# Patient Record
Sex: MALE | Race: WHITE | NOT HISPANIC OR LATINO | URBAN - METROPOLITAN AREA
[De-identification: names, ages, dates, MRNs, and addresses within clinical notes are randomized per-mention and may not be internally consistent; named-entity substitution may affect disease eponyms.]

---

## 2017-07-10 ENCOUNTER — INPATIENT (INPATIENT)
Facility: HOSPITAL | Age: 67
LOS: 6 days | Discharge: HOME | DRG: 301 | End: 2017-07-17
Attending: INTERNAL MEDICINE | Admitting: INTERNAL MEDICINE
Payer: MEDICARE

## 2017-07-10 VITALS
HEART RATE: 64 BPM | WEIGHT: 231.04 LBS | SYSTOLIC BLOOD PRESSURE: 130 MMHG | RESPIRATION RATE: 20 BRPM | TEMPERATURE: 97 F | OXYGEN SATURATION: 97 % | DIASTOLIC BLOOD PRESSURE: 83 MMHG

## 2017-07-10 DIAGNOSIS — I82.409 ACUTE EMBOLISM AND THROMBOSIS OF UNSPECIFIED DEEP VEINS OF UNSPECIFIED LOWER EXTREMITY: ICD-10-CM

## 2017-07-10 LAB
ANION GAP SERPL CALC-SCNC: 8 MMOL/L — SIGNIFICANT CHANGE UP (ref 5–17)
APTT BLD: 33.6 SEC — SIGNIFICANT CHANGE UP (ref 27.5–37.4)
BASOPHILS # BLD AUTO: 0 K/UL — SIGNIFICANT CHANGE UP (ref 0–0.2)
BASOPHILS NFR BLD AUTO: 0.8 % — SIGNIFICANT CHANGE UP (ref 0–2)
BUN SERPL-MCNC: 17 MG/DL — SIGNIFICANT CHANGE UP (ref 7–18)
CALCIUM SERPL-MCNC: 8.5 MG/DL — SIGNIFICANT CHANGE UP (ref 8.4–10.5)
CHLORIDE SERPL-SCNC: 104 MMOL/L — SIGNIFICANT CHANGE UP (ref 96–108)
CO2 SERPL-SCNC: 27 MMOL/L — SIGNIFICANT CHANGE UP (ref 22–31)
CREAT SERPL-MCNC: 0.87 MG/DL — SIGNIFICANT CHANGE UP (ref 0.5–1.3)
EOSINOPHIL # BLD AUTO: 0.2 K/UL — SIGNIFICANT CHANGE UP (ref 0–0.5)
EOSINOPHIL NFR BLD AUTO: 4.5 % — SIGNIFICANT CHANGE UP (ref 0–6)
GLUCOSE SERPL-MCNC: 85 MG/DL — SIGNIFICANT CHANGE UP (ref 70–99)
HCT VFR BLD CALC: 38.3 % — LOW (ref 39–50)
HGB BLD-MCNC: 13.2 G/DL — SIGNIFICANT CHANGE UP (ref 13–17)
INR BLD: 1.16 RATIO — SIGNIFICANT CHANGE UP (ref 0.88–1.16)
LYMPHOCYTES # BLD AUTO: 1.5 K/UL — SIGNIFICANT CHANGE UP (ref 1–3.3)
LYMPHOCYTES # BLD AUTO: 29.2 % — SIGNIFICANT CHANGE UP (ref 13–44)
MCHC RBC-ENTMCNC: 33.1 PG — SIGNIFICANT CHANGE UP (ref 27–34)
MCHC RBC-ENTMCNC: 34.4 GM/DL — SIGNIFICANT CHANGE UP (ref 32–36)
MCV RBC AUTO: 96.4 FL — SIGNIFICANT CHANGE UP (ref 80–100)
MONOCYTES # BLD AUTO: 0.3 K/UL — SIGNIFICANT CHANGE UP (ref 0–0.9)
MONOCYTES NFR BLD AUTO: 6.5 % — SIGNIFICANT CHANGE UP (ref 2–14)
NEUTROPHILS # BLD AUTO: 3 K/UL — SIGNIFICANT CHANGE UP (ref 1.8–7.4)
NEUTROPHILS NFR BLD AUTO: 59 % — SIGNIFICANT CHANGE UP (ref 43–77)
PLATELET # BLD AUTO: 188 K/UL — SIGNIFICANT CHANGE UP (ref 150–400)
POTASSIUM SERPL-MCNC: 4.5 MMOL/L — SIGNIFICANT CHANGE UP (ref 3.5–5.3)
POTASSIUM SERPL-SCNC: 4.5 MMOL/L — SIGNIFICANT CHANGE UP (ref 3.5–5.3)
PROTHROM AB SERPL-ACNC: 12.7 SEC — SIGNIFICANT CHANGE UP (ref 9.8–12.7)
RBC # BLD: 3.97 M/UL — LOW (ref 4.2–5.8)
RBC # FLD: 12.1 % — SIGNIFICANT CHANGE UP (ref 10.3–14.5)
SODIUM SERPL-SCNC: 139 MMOL/L — SIGNIFICANT CHANGE UP (ref 135–145)
WBC # BLD: 5.1 K/UL — SIGNIFICANT CHANGE UP (ref 3.8–10.5)
WBC # FLD AUTO: 5.1 K/UL — SIGNIFICANT CHANGE UP (ref 3.8–10.5)

## 2017-07-10 PROCEDURE — 74177 CT ABD & PELVIS W/CONTRAST: CPT | Mod: 26

## 2017-07-10 PROCEDURE — 99285 EMERGENCY DEPT VISIT HI MDM: CPT

## 2017-07-10 PROCEDURE — 93971 EXTREMITY STUDY: CPT | Mod: 26,LT

## 2017-07-10 RX ORDER — ENOXAPARIN SODIUM 100 MG/ML
100 INJECTION SUBCUTANEOUS ONCE
Qty: 0 | Refills: 0 | Status: COMPLETED | OUTPATIENT
Start: 2017-07-10 | End: 2017-07-10

## 2017-07-10 RX ORDER — ENOXAPARIN SODIUM 100 MG/ML
100 INJECTION SUBCUTANEOUS EVERY 12 HOURS
Qty: 0 | Refills: 0 | Status: DISCONTINUED | OUTPATIENT
Start: 2017-07-10 | End: 2017-07-17

## 2017-07-10 RX ORDER — ACETAMINOPHEN 500 MG
650 TABLET ORAL EVERY 6 HOURS
Qty: 0 | Refills: 0 | Status: DISCONTINUED | OUTPATIENT
Start: 2017-07-10 | End: 2017-07-17

## 2017-07-10 RX ADMIN — ENOXAPARIN SODIUM 100 MILLIGRAM(S): 100 INJECTION SUBCUTANEOUS at 17:22

## 2017-07-10 NOTE — ED PROVIDER NOTE - PSH
Benign Tumor of Short Bones of Lower Limb  removed  Hand(s) Dupuytrens Contracture  repair x 3 bilaterally  History of Tonsillectomy    S/P Colonoscopic Polypectomy  ? year.

## 2017-07-10 NOTE — ED PROVIDER NOTE - MEDICAL DECISION MAKING DETAILS
66 y/o male presents to the ED c/o L calf pain x 3 days. Will get sono to r/u DVT. Will give analgesia and reassess.

## 2017-07-10 NOTE — ED PROVIDER NOTE - OBJECTIVE STATEMENT
66 y/o male with PMHx of HLD presents to the ED c/o L calf pain x 3 days. Pt notes he was sent to the ED by his PMD for his Sx. Pt denies chest pain, shortness of breath, trauma or any other complaints. NKDA.

## 2017-07-11 DIAGNOSIS — I82.409 ACUTE EMBOLISM AND THROMBOSIS OF UNSPECIFIED DEEP VEINS OF UNSPECIFIED LOWER EXTREMITY: ICD-10-CM

## 2017-07-11 DIAGNOSIS — G40.909 EPILEPSY, UNSPECIFIED, NOT INTRACTABLE, WITHOUT STATUS EPILEPTICUS: ICD-10-CM

## 2017-07-11 DIAGNOSIS — M72.0 PALMAR FASCIAL FIBROMATOSIS [DUPUYTREN]: ICD-10-CM

## 2017-07-11 DIAGNOSIS — I82.432 ACUTE EMBOLISM AND THROMBOSIS OF LEFT POPLITEAL VEIN: ICD-10-CM

## 2017-07-11 DIAGNOSIS — E78.5 HYPERLIPIDEMIA, UNSPECIFIED: ICD-10-CM

## 2017-07-11 DIAGNOSIS — Z29.9 ENCOUNTER FOR PROPHYLACTIC MEASURES, UNSPECIFIED: ICD-10-CM

## 2017-07-11 LAB
24R-OH-CALCIDIOL SERPL-MCNC: 17 NG/ML — LOW (ref 30–100)
ALBUMIN SERPL ELPH-MCNC: 3.3 G/DL — LOW (ref 3.5–5)
ALP SERPL-CCNC: 101 U/L — SIGNIFICANT CHANGE UP (ref 40–120)
ALT FLD-CCNC: 18 U/L DA — SIGNIFICANT CHANGE UP (ref 10–60)
ANION GAP SERPL CALC-SCNC: 7 MMOL/L — SIGNIFICANT CHANGE UP (ref 5–17)
AST SERPL-CCNC: 15 U/L — SIGNIFICANT CHANGE UP (ref 10–40)
AT III ACT/NOR PPP CHRO: 97 % — SIGNIFICANT CHANGE UP (ref 85–135)
BASOPHILS # BLD AUTO: 0.1 K/UL — SIGNIFICANT CHANGE UP (ref 0–0.2)
BASOPHILS NFR BLD AUTO: 1.4 % — SIGNIFICANT CHANGE UP (ref 0–2)
BILIRUB SERPL-MCNC: 0.4 MG/DL — SIGNIFICANT CHANGE UP (ref 0.2–1.2)
BUN SERPL-MCNC: 13 MG/DL — SIGNIFICANT CHANGE UP (ref 7–18)
CALCIUM SERPL-MCNC: 8.2 MG/DL — LOW (ref 8.4–10.5)
CHLORIDE SERPL-SCNC: 105 MMOL/L — SIGNIFICANT CHANGE UP (ref 96–108)
CHOLEST SERPL-MCNC: 186 MG/DL — SIGNIFICANT CHANGE UP (ref 10–199)
CO2 SERPL-SCNC: 26 MMOL/L — SIGNIFICANT CHANGE UP (ref 22–31)
CREAT SERPL-MCNC: 0.76 MG/DL — SIGNIFICANT CHANGE UP (ref 0.5–1.3)
EOSINOPHIL # BLD AUTO: 0.2 K/UL — SIGNIFICANT CHANGE UP (ref 0–0.5)
EOSINOPHIL NFR BLD AUTO: 4.5 % — SIGNIFICANT CHANGE UP (ref 0–6)
FOLATE SERPL-MCNC: 18.7 NG/ML — SIGNIFICANT CHANGE UP (ref 4.8–24.2)
GLUCOSE SERPL-MCNC: 77 MG/DL — SIGNIFICANT CHANGE UP (ref 70–99)
HBA1C BLD-MCNC: 5.3 % — SIGNIFICANT CHANGE UP (ref 4–5.6)
HCT VFR BLD CALC: 37.7 % — LOW (ref 39–50)
HCYS SERPL-MCNC: 9.2 UMOL/L — SIGNIFICANT CHANGE UP (ref 5–20)
HDLC SERPL-MCNC: 60 MG/DL — SIGNIFICANT CHANGE UP (ref 40–125)
HGB BLD-MCNC: 13.4 G/DL — SIGNIFICANT CHANGE UP (ref 13–17)
LIPID PNL WITH DIRECT LDL SERPL: 104 MG/DL — SIGNIFICANT CHANGE UP
LYMPHOCYTES # BLD AUTO: 1 K/UL — SIGNIFICANT CHANGE UP (ref 1–3.3)
LYMPHOCYTES # BLD AUTO: 23.1 % — SIGNIFICANT CHANGE UP (ref 13–44)
MAGNESIUM SERPL-MCNC: 2.5 MG/DL — SIGNIFICANT CHANGE UP (ref 1.6–2.6)
MCHC RBC-ENTMCNC: 34 PG — SIGNIFICANT CHANGE UP (ref 27–34)
MCHC RBC-ENTMCNC: 35.4 GM/DL — SIGNIFICANT CHANGE UP (ref 32–36)
MCV RBC AUTO: 95.9 FL — SIGNIFICANT CHANGE UP (ref 80–100)
MONOCYTES # BLD AUTO: 0.4 K/UL — SIGNIFICANT CHANGE UP (ref 0–0.9)
MONOCYTES NFR BLD AUTO: 8.6 % — SIGNIFICANT CHANGE UP (ref 2–14)
NEUTROPHILS # BLD AUTO: 2.8 K/UL — SIGNIFICANT CHANGE UP (ref 1.8–7.4)
NEUTROPHILS NFR BLD AUTO: 62.4 % — SIGNIFICANT CHANGE UP (ref 43–77)
PHENOBARB SERPL-MCNC: 21.8 UG/ML — SIGNIFICANT CHANGE UP (ref 15–40)
PHOSPHATE SERPL-MCNC: 3 MG/DL — SIGNIFICANT CHANGE UP (ref 2.5–4.5)
PLATELET # BLD AUTO: 186 K/UL — SIGNIFICANT CHANGE UP (ref 150–400)
POTASSIUM SERPL-MCNC: 4 MMOL/L — SIGNIFICANT CHANGE UP (ref 3.5–5.3)
POTASSIUM SERPL-SCNC: 4 MMOL/L — SIGNIFICANT CHANGE UP (ref 3.5–5.3)
PROT SERPL-MCNC: 7.1 G/DL — SIGNIFICANT CHANGE UP (ref 6–8.3)
RBC # BLD: 3.93 M/UL — LOW (ref 4.2–5.8)
RBC # FLD: 12.4 % — SIGNIFICANT CHANGE UP (ref 10.3–14.5)
SODIUM SERPL-SCNC: 138 MMOL/L — SIGNIFICANT CHANGE UP (ref 135–145)
TOTAL CHOLESTEROL/HDL RATIO MEASUREMENT: 3.1 RATIO — LOW (ref 3.4–9.6)
TRIGL SERPL-MCNC: 109 MG/DL — SIGNIFICANT CHANGE UP (ref 10–149)
TSH SERPL-MCNC: 2.52 UU/ML — SIGNIFICANT CHANGE UP (ref 0.34–4.82)
VIT B12 SERPL-MCNC: 363 PG/ML — SIGNIFICANT CHANGE UP (ref 243–894)
WBC # BLD: 4.5 K/UL — SIGNIFICANT CHANGE UP (ref 3.8–10.5)
WBC # FLD AUTO: 4.5 K/UL — SIGNIFICANT CHANGE UP (ref 3.8–10.5)

## 2017-07-11 PROCEDURE — G0452: CPT | Mod: 26

## 2017-07-11 RX ORDER — PHENOBARBITAL 60 MG
32.4 TABLET ORAL ONCE
Qty: 0 | Refills: 0 | Status: DISCONTINUED | OUTPATIENT
Start: 2017-07-11 | End: 2017-07-11

## 2017-07-11 RX ORDER — PHENOBARBITAL 60 MG
30 TABLET ORAL
Qty: 0 | Refills: 0 | Status: DISCONTINUED | OUTPATIENT
Start: 2017-07-11 | End: 2017-07-11

## 2017-07-11 RX ORDER — TAMSULOSIN HYDROCHLORIDE 0.4 MG/1
0.4 CAPSULE ORAL AT BEDTIME
Qty: 0 | Refills: 0 | Status: DISCONTINUED | OUTPATIENT
Start: 2017-07-11 | End: 2017-07-17

## 2017-07-11 RX ORDER — METOPROLOL TARTRATE 50 MG
100 TABLET ORAL
Qty: 0 | Refills: 0 | Status: DISCONTINUED | OUTPATIENT
Start: 2017-07-11 | End: 2017-07-17

## 2017-07-11 RX ORDER — LEVOTHYROXINE SODIUM 125 MCG
25 TABLET ORAL DAILY
Qty: 0 | Refills: 0 | Status: DISCONTINUED | OUTPATIENT
Start: 2017-07-11 | End: 2017-07-17

## 2017-07-11 RX ORDER — AMIODARONE HYDROCHLORIDE 400 MG/1
200 TABLET ORAL DAILY
Qty: 0 | Refills: 0 | Status: DISCONTINUED | OUTPATIENT
Start: 2017-07-11 | End: 2017-07-17

## 2017-07-11 RX ORDER — WARFARIN SODIUM 2.5 MG/1
5 TABLET ORAL ONCE
Qty: 0 | Refills: 0 | Status: COMPLETED | OUTPATIENT
Start: 2017-07-11 | End: 2017-07-11

## 2017-07-11 RX ORDER — ATORVASTATIN CALCIUM 80 MG/1
10 TABLET, FILM COATED ORAL AT BEDTIME
Qty: 0 | Refills: 0 | Status: DISCONTINUED | OUTPATIENT
Start: 2017-07-11 | End: 2017-07-17

## 2017-07-11 RX ORDER — PHENOBARBITAL 60 MG
32.4 TABLET ORAL
Qty: 0 | Refills: 0 | Status: DISCONTINUED | OUTPATIENT
Start: 2017-07-11 | End: 2017-07-17

## 2017-07-11 RX ADMIN — Medication 650 MILLIGRAM(S): at 05:58

## 2017-07-11 RX ADMIN — Medication 100 MILLIGRAM(S): at 17:47

## 2017-07-11 RX ADMIN — WARFARIN SODIUM 5 MILLIGRAM(S): 2.5 TABLET ORAL at 22:24

## 2017-07-11 RX ADMIN — Medication 30 MILLIGRAM(S): at 05:58

## 2017-07-11 RX ADMIN — ATORVASTATIN CALCIUM 10 MILLIGRAM(S): 80 TABLET, FILM COATED ORAL at 22:24

## 2017-07-11 RX ADMIN — Medication 100 MILLIGRAM(S): at 01:06

## 2017-07-11 RX ADMIN — Medication 650 MILLIGRAM(S): at 22:23

## 2017-07-11 RX ADMIN — Medication 100 MILLIGRAM(S): at 05:58

## 2017-07-11 RX ADMIN — Medication 650 MILLIGRAM(S): at 06:59

## 2017-07-11 RX ADMIN — Medication 32.4 MILLIGRAM(S): at 17:47

## 2017-07-11 RX ADMIN — ENOXAPARIN SODIUM 100 MILLIGRAM(S): 100 INJECTION SUBCUTANEOUS at 17:47

## 2017-07-11 RX ADMIN — Medication 650 MILLIGRAM(S): at 23:20

## 2017-07-11 RX ADMIN — AMIODARONE HYDROCHLORIDE 200 MILLIGRAM(S): 400 TABLET ORAL at 05:57

## 2017-07-11 RX ADMIN — ENOXAPARIN SODIUM 100 MILLIGRAM(S): 100 INJECTION SUBCUTANEOUS at 05:57

## 2017-07-11 RX ADMIN — Medication 32.4 MILLIGRAM(S): at 01:06

## 2017-07-11 NOTE — H&P ADULT - PROBLEM SELECTOR PLAN 3
c/w lipitor  f/u pharmacy in AM for correct dosage, patient does not remember  ([phone]) [facility]  f/u lipid profile

## 2017-07-11 NOTE — PATIENT PROFILE ADULT. - VISION (WITH CORRECTIVE LENSES IF THE PATIENT USUALLY WEARS THEM):
Normal vision: sees adequately in most situations; can see medication labels, newsprint/but pt wears reading glasses

## 2017-07-11 NOTE — CONSULT NOTE ADULT - SUBJECTIVE AND OBJECTIVE BOX
6:30pm 6S    Orig  HPI:  Patient is a 67M with PMH seizure  HLD, hx mitral valve replacement 2011 (reports being on amiodarone after surgery) had severe MR with ruptured tendinae,  presenting  due to 1 day Hx L leg pain, referred by PMD to come to ED.  He denies fever, chills, SOB, palpitations, chest pain, recent travel or immobilization, current cancer, nausea, vomiting, diarrhea or constipation. Pt has no coronary hx and at time of MVR no revasc intervention recommended  Pt repeatedly questioned as to why taking Amio, does not know, not ever told of AFib, other cardiac arrhythmias, never took AC therapy before.    PMH   Seizure Dis since childhood  Prostate CA, s/p seed implants [id number]    Surgery Hx: Benign Tumor of Short Bones of Lower Limb  removed, Hand(s) Dupuytrens Contracture  repair x 3 bilaterally, History of Tonsillectomy, S/P Colonoscopic Polypectomy  Fam Hx: family history of DVT  Social Hx: negative for smoking, EtOH or illicit drugs    Allergies: NKDA (11 Jul 2017 00:46)    PRE-ADM MEDS: as per res adm note    SURGERIES: dupuytren's contracture bilaterally with three prior repairs    ROS: non-contributory except as outlined above.      EXAM:  In NAD    Vital Signs Last 24 Hrs  T(C): 36.6 (11 Jul 2017 14:28), Max: 36.6 (11 Jul 2017 14:28)  T(F): 97.8 (11 Jul 2017 14:28), Max: 97.8 (11 Jul 2017 14:28)  HR: 69 (11 Jul 2017 17:18) (57 - 69)  BP: 127/76 (11 Jul 2017 17:18) (122/58 - 129/65)  BP(mean): --  RR: 16 (11 Jul 2017 14:28) (16 - 17)  SpO2: 96% (11 Jul 2017 14:28) (96% - 97%)  Daily          HEENT: no icterus  NECK: no JVD/HJR, nl c. pulses w/o bruits LUNS: clear  COR: inc s1/ nl s2, no m, g, rub  ABD: nl BS, soft, not tender, no organomeg/masses  LE: no edema, periperal pulses not palpable    EKG: NSR, 1st deg AVB, otherwise unremarkable    US Duplex Venous Lower Ext Ltd, Left (07.10.17 @ 15:41) >  Left popliteal and peroneal vein nonocclusive DVT.    CT Abdomen and Pelvis w/ IV Cont (07.10.17 @ 18:26)   < from: CT Abdomen and Pelvis w/ IV Cont (07.10.17 @ 18:26) >  Atelectasis lung bases. Cardiomegaly noted  No gross DVT in the IVC, common iliac and external iliac veins.        LABS:                         13.4   4.5   )-----------( 186      ( 11 Jul 2017 07:09 )             37.7     07-11    138  |  105  |  13  ----------------------------<  77  4.0   |  26  |  0.76    Ca    8.2<L>      11 Jul 2017 07:09  Phos  3.0     07-11  Mg     2.5     07-11    TPro  7.1  /  Alb  3.3<L>  /  TBili  0.4  /  DBili  x   /  AST  15  /  ALT  18  /  AlkPhos  101  07-11      MEDICATIONS  (STANDING):    enoxaparin Injectable 100 milliGRAM(s) SubCutaneous every 12 hours  phenytoin   Capsule 100 milliGRAM(s) Oral <User Schedule>  phenytoin   Capsule 100 milliGRAM(s) Oral <User Schedule>  amiodarone    Tablet 200 milliGRAM(s) Oral daily  atorvastatin 10 milliGRAM(s) Oral at bedtime  tamsulosin 0.4 milliGRAM(s) Oral at bedtime  levothyroxine 25 MICROGram(s) Oral daily  metoprolol 100 milliGRAM(s) Oral two times a day  PHENobarbital 32.4 milliGRAM(s) Oral two times a day

## 2017-07-11 NOTE — H&P ADULT - HISTORY OF PRESENT ILLNESS
Patient is a 67M, from home, lives with mother and brother, unsteady gait at baseline, with PMH seizure d/o, dupuytren's contracture bilaterally with three prior repairs, HLD, hx "leaky valve" s/p mitral valve replacement 2011 2/2 severe MR with ruptured tendinae, pericardial effusion drainage in 2011, presenting due to 1 day Hx L leg pain, referred by PMD to come to ED. Patient endorses family history of DVT- mother has 3 DVTs. He denies fever, chills, SOB, palpitations, chest pain, recent travel or immobilization, current cancer, nausea, vomiting, diarrhea or constipation.     PMH: as per HPI  Surgery Hx: Benign Tumor of Short Bones of Lower Limb  removed, Hand(s) Dupuytrens Contracture  repair x 3 bilaterally, History of Tonsillectomy, S/P Colonoscopic Polypectomy  Fam Hx: family history of DVT  Social Hx: negative for smoking, EtOH or illicit drugs  Allergies: NKDA Patient is a 67M, from home, lives with mother and brother, unsteady gait at baseline, with PMH seizure d/o, dupuytren's contracture bilaterally with three prior repairs, HLD, hx "leaky valve" s/p mitral valve replacement 2011 (reports being on amiodarone after surgery) 2/2 severe MR with ruptured tendinae, pericardial effusion drainage in 2011, presenting due to 1 day Hx L leg pain, referred by PMD to come to ED. Patient endorses family history of DVT- mother has 3 DVTs. He denies fever, chills, SOB, palpitations, chest pain, recent travel or immobilization, current cancer, nausea, vomiting, diarrhea or constipation.     PMH: as per HPI  Surgery Hx: Benign Tumor of Short Bones of Lower Limb  removed, Hand(s) Dupuytrens Contracture  repair x 3 bilaterally, History of Tonsillectomy, S/P Colonoscopic Polypectomy  Fam Hx: family history of DVT  Social Hx: negative for smoking, EtOH or illicit drugs  Allergies: NKDA

## 2017-07-11 NOTE — CONSULT NOTE ADULT - SUBJECTIVE AND OBJECTIVE BOX
Patient is a 67y old  Male who presents with a chief complaint of L calf pain (11 Jul 2017 00:46)    Seems to be unprovoked. But realized that he has left leg swelling and pain for at least five days before he presented himself to the hospital. Mother has a strong history of three events. Mother is on coumadin.    PMH seizure d/o, dupuytren's contracture bilaterally with three prior repairs, HLD, hx "leaky valve" s/p mitral valve replacement 2011 (reports being on amiodarone after surgery) severe MR with ruptured tendinae, pericardial effusion drainage in 2011,    PMH: as per HPI ALSO has history of prostate cancer and was treated with seed implants.  Surgery Hx: Benign Tumor of Short Bones of Lower Limb  removed, Hand(s) Dupuytrens Contracture  repair x 3 bilaterally, History of Tonsillectomy, S/P Colonoscopic Polypectomy  Fam Hx: family history of DVT  Social Hx: negative for smoking, EtOH or illicit drugs. Retired.   Allergies: NKDA (11 Jul 2017 00:46)       ROS:  no headaches, no chest pain, no shortness of breath, no bleeding no bruising.     PAST MEDICAL & SURGICAL HISTORY:  Dupuytren's Contracture of Hand  Seizure Disorder  Hyperlipemia  S/P Colonoscopic Polypectomy:   Hand(s) Dupuytrens Contracture: repair x 3 bilaterally  Benign Tumor of Short Bones of Lower Limb: removed  History of Tonsillectomy      SOCIAL HISTORY:lives with mother and brother    FAMILY HISTORY:  DVT in mother times three      MEDICATIONS  (STANDING):  enoxaparin Injectable 100 milliGRAM(s) SubCutaneous every 12 hours  phenytoin   Capsule 100 milliGRAM(s) Oral <User Schedule>  phenytoin   Capsule 100 milliGRAM(s) Oral <User Schedule>  amiodarone    Tablet 200 milliGRAM(s) Oral daily  atorvastatin 10 milliGRAM(s) Oral at bedtime  tamsulosin 0.4 milliGRAM(s) Oral at bedtime  levothyroxine 25 MICROGram(s) Oral daily  metoprolol 100 milliGRAM(s) Oral two times a day  PHENobarbital 32.4 milliGRAM(s) Oral two times a day    MEDICATIONS  (PRN):  acetaminophen   Tablet. 650 milliGRAM(s) Oral every 6 hours PRN Mild Pain (1 - 3)      Allergies    No Known Allergies    Intolerances        Vital Signs Last 24 Hrs  T(C): 36.6 (11 Jul 2017 14:28), Max: 36.6 (11 Jul 2017 14:28)  T(F): 97.8 (11 Jul 2017 14:28), Max: 97.8 (11 Jul 2017 14:28)  HR: 69 (11 Jul 2017 17:18) (57 - 69)  BP: 127/76 (11 Jul 2017 17:18) (122/58 - 129/65)  BP(mean): --  RR: 16 (11 Jul 2017 14:28) (16 - 17)  SpO2: 96% (11 Jul 2017 14:28) (96% - 97%)    PHYSICAL EXAM  General: adult in NAD  HEENT: clear oropharynx, anicteric sclera, pink conjunctiva  Neck: supple  CV: normal S1/S2 with no murmur rubs or gallops  Lungs: positive air movement b/l ant lungs,clear to auscultation, no wheezes, no rales  Abdomen: soft non-tender non-distended, no hepatosplenomegaly  Ext: no clubbing cyanosis or edema  Skin: no rashes and no petechiae. multiple skin tags  Neuro: alert and oriented X 4, no focal deficits      LABS:                          13.4   4.5   )-----------( 186      ( 11 Jul 2017 07:09 )             37.7         Mean Cell Volume : 95.9 fl  Mean Cell Hemoglobin : 34.0 pg  Mean Cell Hemoglobin Concentration : 35.4 gm/dL  Auto Neutrophil # : 2.8 K/uL  Auto Lymphocyte # : 1.0 K/uL  Auto Monocyte # : 0.4 K/uL  Auto Eosinophil # : 0.2 K/uL  Auto Basophil # : 0.1 K/uL  Auto Neutrophil % : 62.4 %  Auto Lymphocyte % : 23.1 %  Auto Monocyte % : 8.6 %  Auto Eosinophil % : 4.5 %  Auto Basophil % : 1.4 %      Serial CBC's  07-11 @ 07:09  Hct-37.7 / Hgb-13.4 / Plat-186 / RBC-3.93 / WBC-4.5  Serial CBC's  07-10 @ 17:04  Hct-38.3 / Hgb-13.2 / Plat-188 / RBC-3.97 / WBC-5.1      07-11    138  |  105  |  13  ----------------------------<  77  4.0   |  26  |  0.76    Ca    8.2<L>      11 Jul 2017 07:09  Phos  3.0     07-11  Mg     2.5     07-11    TPro  7.1  /  Alb  3.3<L>  /  TBili  0.4  /  DBili  x   /  AST  15  /  ALT  18  /  AlkPhos  101  07-11      PT/INR - ( 10 Jul 2017 17:04 )   PT: 12.7 sec;   INR: 1.16 ratio         PTT - ( 10 Jul 2017 17:04 )  PTT:33.6 sec    Folate, Serum: 18.7 ng/mL (07-11 @ 09:23)  Vitamin B12, Serum: 363 pg/mL (07-11 @ 09:23)

## 2017-07-11 NOTE — H&P ADULT - NSHPLABSRESULTS_GEN_ALL_CORE
LABS:                        13.2   5.1   )-----------( 188      ( 10 Jul 2017 17:04 )             38.3     07-10    139  |  104  |  17  ----------------------------<  85  4.5   |  27  |  0.87    Ca    8.5      10 Jul 2017 17:04    PT/INR - ( 10 Jul 2017 17:04 )   PT: 12.7 sec;   INR: 1.16 ratio      PTT - ( 10 Jul 2017 17:04 )  PTT:33.6 sec    EKst deg av block    < from: US Duplex Venous Lower Ext Ltd, Left (07.10.17 @ 15:41) >    IMPRESSION:  Left popliteal and peroneal vein nonocclusive DVT.    Findings were discussed with Dr. Murry on 7/10/2017 3:47 PM with readback.    < end of copied text >    < from: CT Abdomen and Pelvis w/ IV Cont (07.10.17 @ 18:26) >    IMPRESSION:  No gross DVT in the IVC, common iliac and external iliac veins.     < end of copied text >

## 2017-07-11 NOTE — H&P ADULT - NSHPPHYSICALEXAM_GEN_ALL_CORE
INTERVAL HPI/OVERNIGHT EVENTS:  T(C): 36.4 (07-10-17 @ 21:58), Max: 36.4 (07-10-17 @ 21:58)  HR: 57 (07-10-17 @ 21:58) (57 - 64)  BP: 127/68 (07-10-17 @ 21:58) (127/68 - 130/83)  RR: 17 (07-10-17 @ 21:58) (17 - 20)  SpO2: 97% (07-10-17 @ 21:58) (97% - 97%)    PHYSICAL EXAM:  GENERAL: NAD, poor hygiene  HEAD:  Atraumatic, Normocephalic  EYES: EOMI, PERRLA, conjunctiva and sclera clear  ENMT: No tonsillar erythema, exudates, or enlargement; Moist mucous membranes  NECK: Supple, No JVD, Normal thyroid  NERVOUS SYSTEM:  Alert & Oriented X3, Good concentration; Motor Strength 5/5 B/L upper and lower extremities; DTRs 2+ intact and symmetric; unsteady gait  CHEST/LUNG: Clear to percussion bilaterally; No rales, rhonchi, wheezing, or rubs  HEART: Regular rate and rhythm; No murmurs, rubs, or gallops  ABDOMEN: Soft, Nontender, Nondistended; Bowel sounds present  EXTREMITIES:  2+ Peripheral Pulses, No clubbing, cyanosis, or edema  SKIN: pityriasis rosea on back

## 2017-07-11 NOTE — CONSULT NOTE ADULT - ASSESSMENT
A/P    1. s/p unprovoked DVT LLE >> at least 6mos of AC, maybe indefinite. Discussed AC with heme/onc agree Warfarin better choice given the interaction of all DOACs with pt's Dilantin  2. Clinically stable CV status  3. s/p MV repair with intact function based on exam  4. Unclear why on Amio  unless at one time started for afib suppression but pt now 6 yrs post OHS/MVRepair. Will review issue with his pvt cardiologist.  5. Can get 2Decho if not done on the outside within the last year.
Recommend at least three months of anticoagulation and then evaluate at that time. It is not clear if there was a provocation. CT abdomen is negative for any occult disease. 'Hypercoag" workup is underway, but sometimes be misleading if done in acute setting

## 2017-07-11 NOTE — H&P ADULT - PROBLEM SELECTOR PLAN 1
L LE doppler: Left popliteal and peroneal vein nonocclusive DVT  unprovoked DVT- family Hx  c/w full dose lovenox for tx  f/u hypercoag workup for DVT L LE doppler: Left popliteal and peroneal vein nonocclusive DVT  unprovoked DVT- family Hx  c/w full dose lovenox for tx  f/u hypercoag workup for DVT  Dr. Gondal- heme/onc consult  Dr. Ibarra- cardio consult 2/2 extensive cardiac history

## 2017-07-11 NOTE — H&P ADULT - ASSESSMENT
Patient is a 67M, from home, lives with mother and brother, unsteady gait at baseline, with PMH seizure d/o, dupuytren's contracture bilaterally with three prior repairs, HLD, hx "leaky valve" s/p mitral valve replacement 2011 2/2 severe MR with ruptured tendinae, pericardial effusion drainage in 2011, presenting due to 1 day Hx L leg pain, referred by PMD to come to ED. Admitted for Left popliteal and peroneal vein nonocclusive DVT. Fam Hx + DVT. F/u hypercoag work up and genetic markers. Full dose lovenox. Patient is a 67M, from home, lives with mother and brother, unsteady gait at baseline, with PMH seizure d/o, dupuytren's contracture bilaterally with three prior repairs, HLD, hx "leaky valve" s/p mitral valve replacement 2011 2/2 severe MR with ruptured tendinae, pericardial effusion drainage in 2011, presenting due to 1 day Hx L leg pain, referred by PMD to come to ED. Admitted for Left popliteal and peroneal vein nonocclusive DVT. Fam Hx + DVT. F/u hypercoag work up and genetic work-up. Full dose lovenox.

## 2017-07-11 NOTE — CONSULT NOTE ADULT - PROBLEM SELECTOR RECOMMENDATION 9
seems to be unprovoked, and has a strong family history. He is on dilantin and phenobarbital and has a high risk of interaction with NOAC and them. Given that, will recommend to bridge to coumadin. Although there is always a potential interaction with Amiodarone. frequent check will be needed.  Recommend to start at 5 mg po daily and keep lovenox on for at least five days and for at least two days of therapeutic INR ( between 2-3) before discontinuing lovenox

## 2017-07-11 NOTE — CHART NOTE - NSCHARTNOTEFT_GEN_A_CORE
PGY2 informed by Dr. Gondal to start lovenox bridging to coumadin at 5mg, monitor INR and adjust coumadin for goal INR 2-3.

## 2017-07-12 LAB
ANA PAT FLD IF-IMP: ABNORMAL
ANA TITR SER: ABNORMAL
CARDIOLIPIN AB SER-ACNC: NEGATIVE — SIGNIFICANT CHANGE UP
DSDNA AB SER-ACNC: <12 IU/ML — SIGNIFICANT CHANGE UP
INR BLD: 1.22 RATIO — HIGH (ref 0.88–1.16)
MTHFR GENE INTERPRETATION: SIGNIFICANT CHANGE UP
PROTHROM AB SERPL-ACNC: 13.3 SEC — HIGH (ref 9.8–12.7)

## 2017-07-12 RX ORDER — WARFARIN SODIUM 2.5 MG/1
5 TABLET ORAL ONCE
Qty: 0 | Refills: 0 | Status: COMPLETED | OUTPATIENT
Start: 2017-07-12 | End: 2017-07-12

## 2017-07-12 RX ADMIN — Medication 100 MILLIGRAM(S): at 06:08

## 2017-07-12 RX ADMIN — Medication 650 MILLIGRAM(S): at 12:48

## 2017-07-12 RX ADMIN — Medication 100 MILLIGRAM(S): at 14:29

## 2017-07-12 RX ADMIN — ATORVASTATIN CALCIUM 10 MILLIGRAM(S): 80 TABLET, FILM COATED ORAL at 22:49

## 2017-07-12 RX ADMIN — ENOXAPARIN SODIUM 100 MILLIGRAM(S): 100 INJECTION SUBCUTANEOUS at 18:39

## 2017-07-12 RX ADMIN — Medication 100 MILLIGRAM(S): at 18:39

## 2017-07-12 RX ADMIN — AMIODARONE HYDROCHLORIDE 200 MILLIGRAM(S): 400 TABLET ORAL at 06:08

## 2017-07-12 RX ADMIN — Medication 32.4 MILLIGRAM(S): at 06:08

## 2017-07-12 RX ADMIN — Medication 25 MICROGRAM(S): at 06:08

## 2017-07-12 RX ADMIN — Medication 100 MILLIGRAM(S): at 22:49

## 2017-07-12 RX ADMIN — WARFARIN SODIUM 5 MILLIGRAM(S): 2.5 TABLET ORAL at 22:48

## 2017-07-12 RX ADMIN — Medication 650 MILLIGRAM(S): at 13:40

## 2017-07-12 RX ADMIN — ENOXAPARIN SODIUM 100 MILLIGRAM(S): 100 INJECTION SUBCUTANEOUS at 06:08

## 2017-07-12 RX ADMIN — TAMSULOSIN HYDROCHLORIDE 0.4 MILLIGRAM(S): 0.4 CAPSULE ORAL at 22:49

## 2017-07-12 RX ADMIN — Medication 32.4 MILLIGRAM(S): at 18:39

## 2017-07-12 NOTE — PROGRESS NOTE ADULT - PROBLEM SELECTOR PLAN 1
L LE doppler: Left popliteal and peroneal vein nonocclusive DVT  unprovoked DVT- family Hx  c/w full dose lovenox for tx  f/u hypercoag workup for DVT  Pt on dilantin and phenobarbital and has a high risk of interaction with novel AC  As per dr estrada to keep lovenox on for at least five days and for at least two days of therapeutic INR ( between 2-3) before discontinuing lovenox.

## 2017-07-12 NOTE — PROGRESS NOTE ADULT - ASSESSMENT
Patient is a 67M, from home, lives with mother and brother, unsteady gait at baseline, with PMH seizure d/o, dupuytren's contracture bilaterally with three prior repairs, HLD, hx "leaky valve" s/p mitral valve replacement 2011 2/2 severe MR with ruptured tendinae, pericardial effusion drainage in 2011, presenting due to 1 day Hx L leg pain, referred by PMD to come to ED. Admitted for Left popliteal and peroneal vein nonocclusive DVT.

## 2017-07-12 NOTE — PROGRESS NOTE ADULT - SUBJECTIVE AND OBJECTIVE BOX
Patient is a 67y old  Male who presents with a chief complaint of L calf pain (11 Jul 2017 00:46)      INTERVAL HPI/OVERNIGHT EVENTS:    MEDICATIONS  (STANDING):  enoxaparin Injectable 100 milliGRAM(s) SubCutaneous every 12 hours  phenytoin   Capsule 100 milliGRAM(s) Oral <User Schedule>  phenytoin   Capsule 100 milliGRAM(s) Oral <User Schedule>  amiodarone    Tablet 200 milliGRAM(s) Oral daily  atorvastatin 10 milliGRAM(s) Oral at bedtime  tamsulosin 0.4 milliGRAM(s) Oral at bedtime  levothyroxine 25 MICROGram(s) Oral daily  metoprolol 100 milliGRAM(s) Oral two times a day  PHENobarbital 32.4 milliGRAM(s) Oral two times a day  warfarin 5 milliGRAM(s) Oral once    MEDICATIONS  (PRN):  acetaminophen   Tablet. 650 milliGRAM(s) Oral every 6 hours PRN Mild Pain (1 - 3)      Allergies    No Known Allergies    Intolerances        REVIEW OF SYSTEMS:  CONSTITUTIONAL: No fever, weight loss, or fatigue  EYES: No eye pain, visual disturbances, or discharge  ENMT:  No difficulty hearing, tinnitus, vertigo; No sinus or throat pain  NECK: No pain or stiffness  BREASTS: No pain, masses, or nipple discharge  RESPIRATORY: No cough, wheezing, chills or hemoptysis; No shortness of breath  CARDIOVASCULAR: No chest pain, palpitations, dizziness, or leg swelling  GASTROINTESTINAL: No abdominal or epigastric pain. No nausea, vomiting, or hematemesis; No diarrhea or constipation. No melena or hematochezia.  GENITOURINARY: No dysuria, frequency, hematuria, or incontinence  NEUROLOGICAL: No headaches, memory loss, loss of strength, numbness, or tremors  SKIN: No itching, burning, rashes, or lesions   MUSCULOSKELETAL: left calf pain    Vital Signs Last 24 Hrs  T(C): 36.8 (12 Jul 2017 14:21), Max: 36.8 (12 Jul 2017 14:21)  T(F): 98.2 (12 Jul 2017 14:21), Max: 98.2 (12 Jul 2017 14:21)  HR: 54 (12 Jul 2017 14:21) (54 - 62)  BP: 113/70 (12 Jul 2017 14:21) (113/70 - 138/78)  BP(mean): --  RR: 16 (12 Jul 2017 14:21) (16 - 16)  SpO2: 97% (12 Jul 2017 14:21) (95% - 97%)    PHYSICAL EXAM:  GENERAL: NAD, well-groomed, well-developed  HEAD:  Atraumatic, Normocephalic  EYES: EOMI, PERRLA, conjunctiva and sclera clear  ENMT: No tonsillar erythema, exudates, or enlargement; Moist mucous membranes, Good dentition, No lesions  NECK: Supple, No JVD, Normal thyroid  NERVOUS SYSTEM:  Alert & Oriented X3, Good concentration;   CHEST/LUNG: Clear to percussion bilaterally; No rales, rhonchi, wheezing, or rubs  HEART: Regular rate and rhythm; No murmurs, rubs, or gallops  ABDOMEN: Soft, Nontender, Nondistended; Bowel sounds present  EXTREMITIES:  2+ Peripheral Pulses, left calf tenderness present   LYMPH: No lymphadenopathy noted  SKIN: No rashes or lesions    LABS:                        13.4   4.5   )-----------( 186      ( 11 Jul 2017 07:09 )             37.7     07-11    138  |  105  |  13  ----------------------------<  77  4.0   |  26  |  0.76    Ca    8.2<L>      11 Jul 2017 07:09  Phos  3.0     07-11  Mg     2.5     07-11    TPro  7.1  /  Alb  3.3<L>  /  TBili  0.4  /  DBili  x   /  AST  15  /  ALT  18  /  AlkPhos  101  07-11    PT/INR - ( 12 Jul 2017 07:08 )   PT: 13.3 sec;   INR: 1.22 ratio             CAPILLARY BLOOD GLUCOSE          RADIOLOGY & ADDITIONAL TESTS:    Imaging Personally Reviewed:  [ ] YES  [ ] NO    Consultant(s) Notes Reviewed:  [ ] YES  [ ] NO    Care Discussed with Consultants/Other Providers [ ] YES  [ ] NO Patient is a 67y old  Male who presents with a chief complaint of L calf pain (11 Jul 2017 00:46)  diagnosed to have dvt  started on lovenox  seen by hem/cardio  feels better  no pain/no cp  no sob    INTERVAL HPI/OVERNIGHT EVENTS:    MEDICATIONS  (STANDING):  enoxaparin Injectable 100 milliGRAM(s) SubCutaneous every 12 hours  phenytoin   Capsule 100 milliGRAM(s) Oral <User Schedule>  phenytoin   Capsule 100 milliGRAM(s) Oral <User Schedule>  amiodarone    Tablet 200 milliGRAM(s) Oral daily  atorvastatin 10 milliGRAM(s) Oral at bedtime  tamsulosin 0.4 milliGRAM(s) Oral at bedtime  levothyroxine 25 MICROGram(s) Oral daily  metoprolol 100 milliGRAM(s) Oral two times a day  PHENobarbital 32.4 milliGRAM(s) Oral two times a day  warfarin 5 milliGRAM(s) Oral once    MEDICATIONS  (PRN):  acetaminophen   Tablet. 650 milliGRAM(s) Oral every 6 hours PRN Mild Pain (1 - 3)      Allergies    No Known Allergies    Intolerances        REVIEW OF SYSTEMS:  CONSTITUTIONAL: No fever, weight loss, or fatigue  EYES: No eye pain, visual disturbances, or discharge  ENMT:  No difficulty hearing, tinnitus, vertigo; No sinus or throat pain  NECK: No pain or stiffness  BREASTS: No pain, masses, or nipple discharge  RESPIRATORY: No cough, wheezing, chills or hemoptysis; No shortness of breath  CARDIOVASCULAR: No chest pain, palpitations, dizziness, or leg swelling  GASTROINTESTINAL: No abdominal or epigastric pain. No nausea, vomiting, or hematemesis; No diarrhea or constipation. No melena or hematochezia.  GENITOURINARY: No dysuria, frequency, hematuria, or incontinence  NEUROLOGICAL: No headaches, memory loss, loss of strength, numbness, or tremors  SKIN: No itching, burning, rashes, or lesions   MUSCULOSKELETAL: left calf pain    Vital Signs Last 24 Hrs  T(C): 36.8 (12 Jul 2017 14:21), Max: 36.8 (12 Jul 2017 14:21)  T(F): 98.2 (12 Jul 2017 14:21), Max: 98.2 (12 Jul 2017 14:21)  HR: 54 (12 Jul 2017 14:21) (54 - 62)  BP: 113/70 (12 Jul 2017 14:21) (113/70 - 138/78)  BP(mean): --  RR: 16 (12 Jul 2017 14:21) (16 - 16)  SpO2: 97% (12 Jul 2017 14:21) (95% - 97%)    PHYSICAL EXAM:  GENERAL: NAD, well-groomed, well-developed  HEAD:  Atraumatic, Normocephalic  EYES: EOMI, PERRLA, conjunctiva and sclera clear  ENMT: No tonsillar erythema, exudates, or enlargement; Moist mucous membranes, Good dentition, No lesions  NECK: Supple, No JVD, Normal thyroid  NERVOUS SYSTEM:  Alert & Oriented X3, Good concentration;   CHEST/LUNG: Clear to percussion bilaterally; No rales, rhonchi, wheezing, or rubs  HEART: Regular rate and rhythm; No murmurs, rubs, or gallops  ABDOMEN: Soft, Nontender, Nondistended; Bowel sounds present  EXTREMITIES:  2+ Peripheral Pulses, left calf tenderness present   no real sweelling/no erythema  LYMPH: No lymphadenopathy noted  SKIN: No rashes or lesions    LABS:                        13.4   4.5   )-----------( 186      ( 11 Jul 2017 07:09 )             37.7     07-11    138  |  105  |  13  ----------------------------<  77  4.0   |  26  |  0.76    Ca    8.2<L>      11 Jul 2017 07:09  Phos  3.0     07-11  Mg     2.5     07-11    TPro  7.1  /  Alb  3.3<L>  /  TBili  0.4  /  DBili  x   /  AST  15  /  ALT  18  /  AlkPhos  101  07-11    PT/INR - ( 12 Jul 2017 07:08 )   PT: 13.3 sec;   INR: 1.22 ratio             CAPILLARY BLOOD GLUCOSE          RADIOLOGY & ADDITIONAL TESTS:    Imaging Personally Reviewed:  [ ] YES  [ ] NO    Consultant(s) Notes Reviewed:  [ ] YES  [ ] NO    Care Discussed with Consultants/Other Providers [ ] YES  [ ] NO

## 2017-07-13 LAB
INR BLD: 1.17 RATIO — HIGH (ref 0.88–1.16)
PHENYTOIN FREE SERPL-MCNC: 1.7 MG/L — SIGNIFICANT CHANGE UP (ref 1–2)
PHENYTOIN FREE SERPL-MCNC: 16 UG/ML — SIGNIFICANT CHANGE UP (ref 10–20)
PROTHROM AB SERPL-ACNC: 12.8 SEC — HIGH (ref 9.8–12.7)
PTR INTERPRETATION: SIGNIFICANT CHANGE UP

## 2017-07-13 RX ORDER — WARFARIN SODIUM 2.5 MG/1
7.5 TABLET ORAL ONCE
Qty: 0 | Refills: 0 | Status: COMPLETED | OUTPATIENT
Start: 2017-07-13 | End: 2017-07-13

## 2017-07-13 RX ORDER — ERGOCALCIFEROL 1.25 MG/1
50000 CAPSULE ORAL
Qty: 0 | Refills: 0 | Status: DISCONTINUED | OUTPATIENT
Start: 2017-07-13 | End: 2017-07-17

## 2017-07-13 RX ADMIN — Medication 100 MILLIGRAM(S): at 05:44

## 2017-07-13 RX ADMIN — Medication 100 MILLIGRAM(S): at 17:37

## 2017-07-13 RX ADMIN — ERGOCALCIFEROL 50000 UNIT(S): 1.25 CAPSULE ORAL at 21:48

## 2017-07-13 RX ADMIN — Medication 100 MILLIGRAM(S): at 12:11

## 2017-07-13 RX ADMIN — Medication 25 MICROGRAM(S): at 05:44

## 2017-07-13 RX ADMIN — TAMSULOSIN HYDROCHLORIDE 0.4 MILLIGRAM(S): 0.4 CAPSULE ORAL at 21:48

## 2017-07-13 RX ADMIN — AMIODARONE HYDROCHLORIDE 200 MILLIGRAM(S): 400 TABLET ORAL at 05:44

## 2017-07-13 RX ADMIN — ENOXAPARIN SODIUM 100 MILLIGRAM(S): 100 INJECTION SUBCUTANEOUS at 05:44

## 2017-07-13 RX ADMIN — Medication 32.4 MILLIGRAM(S): at 05:44

## 2017-07-13 RX ADMIN — Medication 32.4 MILLIGRAM(S): at 17:37

## 2017-07-13 RX ADMIN — WARFARIN SODIUM 7.5 MILLIGRAM(S): 2.5 TABLET ORAL at 21:50

## 2017-07-13 RX ADMIN — Medication 100 MILLIGRAM(S): at 00:07

## 2017-07-13 RX ADMIN — ENOXAPARIN SODIUM 100 MILLIGRAM(S): 100 INJECTION SUBCUTANEOUS at 17:37

## 2017-07-13 RX ADMIN — ATORVASTATIN CALCIUM 10 MILLIGRAM(S): 80 TABLET, FILM COATED ORAL at 21:48

## 2017-07-13 NOTE — PROGRESS NOTE ADULT - SUBJECTIVE AND OBJECTIVE BOX
HPI:  Patient is a 67M, from home, lives with mother and brother, unsteady gait at baseline, with PMH seizure d/o, dupuytren's contracture bilaterally with three prior repairs, HLD, hx "leaky valve" s/p mitral valve replacement 2011 (reports being on amiodarone after surgery) 2/2 severe MR with ruptured tendinae, pericardial effusion drainage in 2011, presenting due to 1 day Hx L leg pain, referred by PMD to come to ED. Patient endorses family history of DVT- mother has 3 DVTs. He denies fever, chills, SOB, palpitations, chest pain, recent travel or immobilization, current cancer, nausea, vomiting, diarrhea or constipation.   started on lovenox/coumadin as per hem  PMH: as per HPI  Surgery Hx: Benign Tumor of Short Bones of Lower Limb  removed, Hand(s) Dupuytrens Contracture  repair x 3 bilaterally, History of Tonsillectomy, S/P Colonoscopic Polypectomy  Fam Hx: family history of DVT  Social Hx: negative for smoking, EtOH or illicit drugs  Allergies: NKDA (11 Jul 2017 00:46)      Meds:    Allergies:  Allergies    No Known Allergies    Intolerances        REVIEW OF SYSTEMS:    CONSTITUTIONAL: No weakness, fevers or chills  EYES/ENT: No visual changes;  No vertigo or throat pain   NECK: No pain or stiffness  RESPIRATORY: No cough, wheezing, hemoptysis; No shortness of breath  CARDIOVASCULAR: No chest pain or palpitations  GASTROINTESTINAL: No abdominal or epigastric pain. No nausea, vomiting, or hematemesis; No diarrhea or constipation. No melena or hematochezia.  GENITOURINARY: No dysuria, frequency or hematuria  NEUROLOGICAL: No numbness or weakness  SKIN: No itching, burning, rashes, or lesions   All other review of systems is negative unless indicated above.    PHYSICAL EXAM:    Vital Signs Last 24 Hrs  T(C): 36.8 (13 Jul 2017 14:00), Max: 36.8 (13 Jul 2017 14:00)  T(F): 98.3 (13 Jul 2017 14:00), Max: 98.3 (13 Jul 2017 14:00)  HR: 58 (13 Jul 2017 14:00) (58 - 60)  BP: 106/62 (13 Jul 2017 14:00) (106/62 - 132/58)  BP(mean): --  RR: 19 (13 Jul 2017 14:00) (16 - 19)  SpO2: 97% (13 Jul 2017 14:00) (97% - 99%)    HEENT:    Neck:  [  ] Supple  [  ] Lymphadenopathy  [  ] JVD  [  ] Masses  [  ] WNL    CHEST/Respiratory: [ ] Clear to auscultation     [  ] Rales      [  ] Rhonchi      [  ] Wheezing       [  ] Chest Tenderness     [  ] WNL    Cardiovascular:  [  ]S1 S2  [  ] Reg  [  ] Irreg   [  ] No Murmurs   [  ] Murmurs  [  ] Systolic [  ] Diastolic    Gastrointestinal:  [  ] Bowel Sounds  [   ] ABD Soft  [  ] ABD Distention   [  ] No Tenderness [  ] Tenderness  [  ] Organomegaly  [  ] Guarding rigidity  [  ] No Guarding rigidity  [  ] Rebound Tenderness [  ] No Rebound Tenderness    Extremities: [  ] Edema  [  ] No Edema  [  ] Clubbing   [  ] Cyanosis  [  ] Palpable peripheral pulses                          [  ] Tender calf muscles    [  ] No Tender Calf Muscles    Neurological:  [  ] Alert  [  ] Awake  [  ] Oriented  x                              [  ] Focal weakness  [  ] No Focal Weakness    Skin:  [  ] Thrombophlebitis  [  ] Rashes  [  ] Dry  [  ] Ulcers    Ortho:  [  ] Joint Swelling  [  ] Joint erythema [  ] DJD [  ] Increased Temperature to Touch      LABS/DIAGNOSTIC TESTS                  CAPILLARY BLOOD GLUCOSE            Hemoglobin A1C, Whole Blood: 5.3 % (07-11 @ 09:38)    Thyroid Stimulating Hormone, Serum: 2.52 uU/mL (07-11 @ 07:09)    CULTURES:       RADIOLOGY  CXR:    enoxaparin Injectable 100 milliGRAM(s) SubCutaneous every 12 hours  acetaminophen   Tablet. 650 milliGRAM(s) Oral every 6 hours PRN  phenytoin   Capsule 100 milliGRAM(s) Oral <User Schedule>  phenytoin   Capsule 100 milliGRAM(s) Oral <User Schedule>  amiodarone    Tablet 200 milliGRAM(s) Oral daily  atorvastatin 10 milliGRAM(s) Oral at bedtime  tamsulosin 0.4 milliGRAM(s) Oral at bedtime  levothyroxine 25 MICROGram(s) Oral daily  metoprolol 100 milliGRAM(s) Oral two times a day  PHENobarbital 32.4 milliGRAM(s) Oral two times a day  warfarin 7.5 milliGRAM(s) Oral once

## 2017-07-13 NOTE — PROGRESS NOTE ADULT - SUBJECTIVE AND OBJECTIVE BOX
Patient is a 67y old  Male who presents with a chief complaint of L calf pain (11 Jul 2017 00:46)      INTERVAL HPI/OVERNIGHT EVENTS:    MEDICATIONS  (STANDING):  enoxaparin Injectable 100 milliGRAM(s) SubCutaneous every 12 hours  phenytoin   Capsule 100 milliGRAM(s) Oral <User Schedule>  phenytoin   Capsule 100 milliGRAM(s) Oral <User Schedule>  amiodarone    Tablet 200 milliGRAM(s) Oral daily  atorvastatin 10 milliGRAM(s) Oral at bedtime  tamsulosin 0.4 milliGRAM(s) Oral at bedtime  levothyroxine 25 MICROGram(s) Oral daily  metoprolol 100 milliGRAM(s) Oral two times a day  PHENobarbital 32.4 milliGRAM(s) Oral two times a day  warfarin 7.5 milliGRAM(s) Oral once  ergocalciferol 14225 Unit(s) Oral every week    MEDICATIONS  (PRN):  acetaminophen   Tablet. 650 milliGRAM(s) Oral every 6 hours PRN Mild Pain (1 - 3)      Allergies    No Known Allergies    Intolerances        Vital Signs Last 24 Hrs  T(C): 36.8 (13 Jul 2017 14:00), Max: 36.8 (13 Jul 2017 14:00)  T(F): 98.3 (13 Jul 2017 14:00), Max: 98.3 (13 Jul 2017 14:00)  HR: 58 (13 Jul 2017 14:00) (58 - 60)  BP: 106/62 (13 Jul 2017 14:00) (106/62 - 132/58)  BP(mean): --  RR: 19 (13 Jul 2017 14:00) (16 - 19)  SpO2: 97% (13 Jul 2017 14:00) (97% - 99%)    PHYSICAL EXAM:  GENERAL: NAD, well-groomed, well-developed  HEAD:  Atraumatic, Normocephalic  EYES: EOMI, PERRLA, conjunctiva and sclera clear  ENMT: No tonsillar erythema, exudates, or enlargement; Moist mucous membranes, Good dentition, No lesions  NECK: Supple, No JVD, Normal thyroid  NERVOUS SYSTEM:  Alert & Oriented X3,         PT/INR - ( 13 Jul 2017 06:49 )   PT: 12.8 sec;   INR: 1.17 ratio             CAPILLARY BLOOD GLUCOSE          RADIOLOGY & ADDITIONAL TESTS:    Imaging Personally Reviewed:  [ ] YES  [ ] NO    Consultant(s) Notes Reviewed:  [ ] YES  [ ] NO    Care Discussed with Consultants/Other Providers [ ] YES  [ ] NO

## 2017-07-14 LAB
B2 GLYCOPROT1 AB SER QL: NEGATIVE — SIGNIFICANT CHANGE UP
DNA PLOIDY SPEC FC-IMP: SIGNIFICANT CHANGE UP
INR BLD: 1.26 RATIO — HIGH (ref 0.88–1.16)
PROTHROM AB SERPL-ACNC: 13.8 SEC — HIGH (ref 9.8–12.7)

## 2017-07-14 RX ORDER — WARFARIN SODIUM 2.5 MG/1
5 TABLET ORAL DAILY
Qty: 0 | Refills: 0 | Status: COMPLETED | OUTPATIENT
Start: 2017-07-14 | End: 2017-07-16

## 2017-07-14 RX ADMIN — ENOXAPARIN SODIUM 100 MILLIGRAM(S): 100 INJECTION SUBCUTANEOUS at 05:23

## 2017-07-14 RX ADMIN — Medication 32.4 MILLIGRAM(S): at 05:22

## 2017-07-14 RX ADMIN — Medication 100 MILLIGRAM(S): at 21:51

## 2017-07-14 RX ADMIN — AMIODARONE HYDROCHLORIDE 200 MILLIGRAM(S): 400 TABLET ORAL at 05:23

## 2017-07-14 RX ADMIN — Medication 32.4 MILLIGRAM(S): at 17:52

## 2017-07-14 RX ADMIN — Medication 100 MILLIGRAM(S): at 05:22

## 2017-07-14 RX ADMIN — WARFARIN SODIUM 5 MILLIGRAM(S): 2.5 TABLET ORAL at 21:51

## 2017-07-14 RX ADMIN — Medication 25 MICROGRAM(S): at 05:23

## 2017-07-14 RX ADMIN — Medication 100 MILLIGRAM(S): at 13:17

## 2017-07-14 RX ADMIN — TAMSULOSIN HYDROCHLORIDE 0.4 MILLIGRAM(S): 0.4 CAPSULE ORAL at 21:51

## 2017-07-14 RX ADMIN — ENOXAPARIN SODIUM 100 MILLIGRAM(S): 100 INJECTION SUBCUTANEOUS at 17:52

## 2017-07-14 RX ADMIN — ATORVASTATIN CALCIUM 10 MILLIGRAM(S): 80 TABLET, FILM COATED ORAL at 21:51

## 2017-07-14 NOTE — PROGRESS NOTE ADULT - SUBJECTIVE AND OBJECTIVE BOX
Patient is a 67y old  Male who presents with a chief complaint of L calf pain (11 Jul 2017 00:46)      INTERVAL HPI/OVERNIGHT EVENTS:    MEDICATIONS  (STANDING):  enoxaparin Injectable 100 milliGRAM(s) SubCutaneous every 12 hours  phenytoin   Capsule 100 milliGRAM(s) Oral <User Schedule>  phenytoin   Capsule 100 milliGRAM(s) Oral <User Schedule>  amiodarone    Tablet 200 milliGRAM(s) Oral daily  atorvastatin 10 milliGRAM(s) Oral at bedtime  tamsulosin 0.4 milliGRAM(s) Oral at bedtime  levothyroxine 25 MICROGram(s) Oral daily  metoprolol 100 milliGRAM(s) Oral two times a day  PHENobarbital 32.4 milliGRAM(s) Oral two times a day  ergocalciferol 65657 Unit(s) Oral every week  warfarin 5 milliGRAM(s) Oral daily    MEDICATIONS  (PRN):  acetaminophen   Tablet. 650 milliGRAM(s) Oral every 6 hours PRN Mild Pain (1 - 3)      Allergies    No Known Allergies    Intolerances        REVIEW OF SYSTEMS:  CONSTITUTIONAL: No fever, weight loss, or fatigue  EYES: No eye pain, visual disturbances, or discharge  ENMT:  No difficulty hearing, tinnitus, vertigo; No sinus or throat pain  NECK: No pain or stiffness  BREASTS: No pain, masses, or nipple discharge  RESPIRATORY: No cough, wheezing, chills or hemoptysis; No shortness of breath  CARDIOVASCULAR: No chest pain, palpitations, dizziness, or leg swelling  GASTROINTESTINAL: No abdominal or epigastric pain. No nausea, vomiting, or hematemesis; No diarrhea or constipation. No melena or hematochezia.  GENITOURINARY: No dysuria, frequency, hematuria, or incontinence  NEUROLOGICAL: No headaches, memory loss, loss of strength, numbness, or tremors  SKIN: No itching, burning, rashes, or lesions   LYMPH NODES: No enlarged glands  ENDOCRINE: No heat or cold intolerance; No hair loss  MUSCULOSKELETAL: No joint pain or swelling; No muscle, back, or extremity pain  PSYCHIATRIC: No depression, anxiety, mood swings, or difficulty sleeping  HEME/LYMPH: No easy bruising, or bleeding gums  ALLERGY AND IMMUNOLOGIC: No hives or eczema    Vital Signs Last 24 Hrs  T(C): 37.2 (14 Jul 2017 14:09), Max: 37.2 (14 Jul 2017 14:09)  T(F): 98.9 (14 Jul 2017 14:09), Max: 98.9 (14 Jul 2017 14:09)  HR: 58 (14 Jul 2017 14:09) (58 - 60)  BP: 103/61 (14 Jul 2017 14:09) (103/61 - 147/67)  BP(mean): --  RR: 16 (14 Jul 2017 14:09) (16 - 18)  SpO2: 97% (14 Jul 2017 14:09) (97% - 98%)    PHYSICAL EXAM:  GENERAL: NAD, well-groomed, well-developed  HEAD:  Atraumatic, Normocephalic  EYES: EOMI, PERRLA, conjunctiva and sclera clear  ENMT: No tonsillar erythema, exudates, or enlargement; Moist mucous membranes, Good dentition, No lesions  NECK: Supple, No JVD, Normal thyroid  NERVOUS SYSTEM:  Alert & Oriented X3, Good concentration; Motor Strength 5/5 B/L upper and lower extremities; DTRs 2+ intact and symmetric  CHEST/LUNG: Clear to percussion bilaterally; No rales, rhonchi, wheezing, or rubs  HEART: Regular rate and rhythm; No murmurs, rubs, or gallops  ABDOMEN: Soft, Nontender, Nondistended; Bowel sounds present  EXTREMITIES:  2+ Peripheral Pulses, No clubbing, cyanosis, or edema  LYMPH: No lymphadenopathy noted  SKIN: No rashes or lesions    LABS:          PT/INR - ( 14 Jul 2017 06:15 )   PT: 13.8 sec;   INR: 1.26 ratio             CAPILLARY BLOOD GLUCOSE          RADIOLOGY & ADDITIONAL TESTS:    Imaging Personally Reviewed:  [ ] YES  [ ] NO    Consultant(s) Notes Reviewed:  [ ] YES  [ ] NO    Care Discussed with Consultants/Other Providers [ ] YES  [ ] NO Patient is a 67y old  Male who presents with a chief complaint of L calf pain (11 Jul 2017 00:46)  diagnosed to have dvt/started on lovenox/coumadin  seen by cardio/hem    INTERVAL HPI/OVERNIGHT EVENTS:    MEDICATIONS  (STANDING):  enoxaparin Injectable 100 milliGRAM(s) SubCutaneous every 12 hours  phenytoin   Capsule 100 milliGRAM(s) Oral <User Schedule>  phenytoin   Capsule 100 milliGRAM(s) Oral <User Schedule>  amiodarone    Tablet 200 milliGRAM(s) Oral daily  atorvastatin 10 milliGRAM(s) Oral at bedtime  tamsulosin 0.4 milliGRAM(s) Oral at bedtime  levothyroxine 25 MICROGram(s) Oral daily  metoprolol 100 milliGRAM(s) Oral two times a day  PHENobarbital 32.4 milliGRAM(s) Oral two times a day  ergocalciferol 17361 Unit(s) Oral every week  warfarin 5 milliGRAM(s) Oral daily    MEDICATIONS  (PRN):  acetaminophen   Tablet. 650 milliGRAM(s) Oral every 6 hours PRN Mild Pain (1 - 3)      Allergies    No Known Allergies    Intolerances        REVIEW OF SYSTEMS:  CONSTITUTIONAL: No fever, weight loss, or fatigue  EYES: No eye pain, visual disturbances, or discharge  ENMT:  No difficulty hearing, tinnitus, vertigo; No sinus or throat pain  NECK: No pain or stiffness  BREASTS: No pain, masses, or nipple discharge  RESPIRATORY: No cough, wheezing, chills or hemoptysis; No shortness of breath  CARDIOVASCULAR: No chest pain, palpitations, dizziness, or leg swelling  GASTROINTESTINAL: No abdominal or epigastric pain. No nausea, vomiting, or hematemesis; No diarrhea or constipation. No melena or hematochezia.  GENITOURINARY: No dysuria, frequency, hematuria, or incontinence  NEUROLOGICAL: No headaches, memory loss, loss of strength, numbness, or tremors  SKIN: No itching, burning, rashes, or lesions   LYMPH NODES: No enlarged glands  ENDOCRINE: No heat or cold intolerance; No hair loss  MUSCULOSKELETAL: No joint pain or swelling; No muscle, back, or extremity pain  PSYCHIATRIC: No depression, anxiety, mood swings, or difficulty sleeping  HEME/LYMPH: No easy bruising, or bleeding gums  ALLERGY AND IMMUNOLOGIC: No hives or eczema    Vital Signs Last 24 Hrs  T(C): 37.2 (14 Jul 2017 14:09), Max: 37.2 (14 Jul 2017 14:09)  T(F): 98.9 (14 Jul 2017 14:09), Max: 98.9 (14 Jul 2017 14:09)  HR: 58 (14 Jul 2017 14:09) (58 - 60)  BP: 103/61 (14 Jul 2017 14:09) (103/61 - 147/67)  BP(mean): --  RR: 16 (14 Jul 2017 14:09) (16 - 18)  SpO2: 97% (14 Jul 2017 14:09) (97% - 98%)    PHYSICAL EXAM:  GENERAL: NAD, well-groomed, well-developed  HEAD:  Atraumatic, Normocephalic  EYES: EOMI, PERRLA, conjunctiva and sclera clear  ENMT: No tonsillar erythema, exudates, or enlargement; Moist mucous membranes, Good dentition, No lesions  NECK: Supple, No JVD, Normal thyroid  NERVOUS SYSTEM:  Alert & Oriented X3, Good concentration; Motor Strength 5/5 B/L upper and lower extremities; DTRs 2+ intact and symmetric  CHEST/LUNG: Clear to percussion bilaterally; No rales, rhonchi, wheezing, or rubs  HEART: Regular rate and rhythm; No murmurs, rubs, or gallops  ABDOMEN: Soft, Nontender, Nondistended; Bowel sounds present  EXTREMITIES:  2+ Peripheral Pulses, No clubbing, cyanosis, or edema  LYMPH: No lymphadenopathy noted  SKIN: No rashes or lesions    LABS:          PT/INR - ( 14 Jul 2017 06:15 )   PT: 13.8 sec;   INR: 1.26 ratio             CAPILLARY BLOOD GLUCOSE          RADIOLOGY & ADDITIONAL TESTS:    Imaging Personally Reviewed:  [ ] YES  [ ] NO    Consultant(s) Notes Reviewed:  [ ] YES  [ ] NO    Care Discussed with Consultants/Other Providers [ ] YES  [ ] NO

## 2017-07-14 NOTE — PROGRESS NOTE ADULT - SUBJECTIVE AND OBJECTIVE BOX
HPI:  Patient is a 67M, from home, lives with mother and brother, unsteady gait at baseline, with PMH seizure d/o, dupuytren's contracture bilaterally with three prior repairs, HLD, hx "leaky valve" s/p mitral valve replacement 2011 (reports being on amiodarone after surgery) 2/2 severe MR with ruptured tendinae, pericardial effusion drainage in 2011, presenting due to 1 day Hx L leg pain, referred by PMD to come to ED. Patient endorses family history of DVT- mother has 3 DVTs. He denies fever, chills, SOB, palpitations, chest pain, recent travel or immobilization, current cancer, nausea, vomiting, diarrhea or constipation.   pt started on lovenox/coumadin  seen by cardio/hem  feels ok  no new sym  no cp/no sob  PMH: as per HPI  Surgery Hx: Benign Tumor of Short Bones of Lower Limb  removed, Hand(s) Dupuytrens Contracture  repair x 3 bilaterally, History of Tonsillectomy, S/P Colonoscopic Polypectomy  Fam Hx: family history of DVT  Social Hx: negative for smoking, EtOH or illicit drugs  Allergies: NKDA (11 Jul 2017 00:46)      Meds:    Allergies:  Allergies    No Known Allergies    Intolerances        REVIEW OF SYSTEMS:  denies cp/leg pain improving  CONSTITUTIONAL: No weakness, fevers or chills  EYES/ENT: No visual changes;  No vertigo or throat pain   NECK: No pain or stiffness  RESPIRATORY: No cough, wheezing, hemoptysis; No shortness of breath  CARDIOVASCULAR: No chest pain or palpitations  GASTROINTESTINAL: No abdominal or epigastric pain. No nausea, vomiting, or hematemesis; No diarrhea or constipation. No melena or hematochezia.  GENITOURINARY: No dysuria, frequency or hematuria  NEUROLOGICAL: No numbness or weakness  SKIN: No itching, burning, rashes, or lesions   All other review of systems is negative unless indicated above.    PHYSICAL EXAM:    Vital Signs Last 24 Hrs  T(C): 37.2 (14 Jul 2017 14:09), Max: 37.2 (14 Jul 2017 14:09)  T(F): 98.9 (14 Jul 2017 14:09), Max: 98.9 (14 Jul 2017 14:09)  HR: 58 (14 Jul 2017 14:09) (58 - 60)  BP: 103/61 (14 Jul 2017 14:09) (103/61 - 147/67)  BP(mean): --  RR: 16 (14 Jul 2017 14:09) (16 - 18)  SpO2: 97% (14 Jul 2017 14:09) (97% - 98%)    HEENT:  awake alert in nad  lungs clear  cardia reg  abd soft  lle notenderness  Neck:  [  ] Supple  [  ] Lymphadenopathy  [  ] JVD  [  ] Masses  [  ] WNL    CHEST/Respiratory: [ ] Clear to auscultation     [  ] Rales      [  ] Rhonchi      [  ] Wheezing       [  ] Chest Tenderness     [  ] WNL    Cardiovascular:  [  ]S1 S2  [  ] Reg  [  ] Irreg   [  ] No Murmurs   [  ] Murmurs  [  ] Systolic [  ] Diastolic    Gastrointestinal:  [  ] Bowel Sounds  [   ] ABD Soft  [  ] ABD Distention   [  ] No Tenderness [  ] Tenderness  [  ] Organomegaly  [  ] Guarding rigidity  [  ] No Guarding rigidity  [  ] Rebound Tenderness [  ] No Rebound Tenderness    Extremities: [  ] Edema  [  ] No Edema  [  ] Clubbing   [  ] Cyanosis  [  ] Palpable peripheral pulses                          [  ] Tender calf muscles    [  ] No Tender Calf Muscles    Neurological:  [  ] Alert  [  ] Awake  [  ] Oriented  x                              [  ] Focal weakness  [  ] No Focal Weakness    Skin:  [  ] Thrombophlebitis  [  ] Rashes  [  ] Dry  [  ] Ulcers    Ortho:  [  ] Joint Swelling  [  ] Joint erythema [  ] DJD [  ] Increased Temperature to Touch      LABS/DIAGNOSTIC TESTS                  CAPILLARY BLOOD GLUCOSE            Hemoglobin A1C, Whole Blood: 5.3 % (07-11 @ 09:38)    Thyroid Stimulating Hormone, Serum: 2.52 uU/mL (07-11 @ 07:09)    CULTURES:       RADIOLOGY  CXR:    enoxaparin Injectable 100 milliGRAM(s) SubCutaneous every 12 hours  acetaminophen   Tablet. 650 milliGRAM(s) Oral every 6 hours PRN  phenytoin   Capsule 100 milliGRAM(s) Oral <User Schedule>  phenytoin   Capsule 100 milliGRAM(s) Oral <User Schedule>  amiodarone    Tablet 200 milliGRAM(s) Oral daily  atorvastatin 10 milliGRAM(s) Oral at bedtime  tamsulosin 0.4 milliGRAM(s) Oral at bedtime  levothyroxine 25 MICROGram(s) Oral daily  metoprolol 100 milliGRAM(s) Oral two times a day  PHENobarbital 32.4 milliGRAM(s) Oral two times a day  ergocalciferol 84797 Unit(s) Oral every week  warfarin 5 milliGRAM(s) Oral daily

## 2017-07-14 NOTE — PROGRESS NOTE ADULT - ASSESSMENT
1.lle dvt  lovenox/coumadin as per hem  folllow inr  2.s/p mvr  cont meds as per cardio  3.prostate ca  s/p seeding  4.hypothyroidism  syn  5.low vit d  replace

## 2017-07-15 LAB
INR BLD: 1.77 RATIO — HIGH (ref 0.88–1.16)
PROTHROM AB SERPL-ACNC: 19.5 SEC — HIGH (ref 9.8–12.7)

## 2017-07-15 RX ADMIN — ENOXAPARIN SODIUM 100 MILLIGRAM(S): 100 INJECTION SUBCUTANEOUS at 06:12

## 2017-07-15 RX ADMIN — Medication 100 MILLIGRAM(S): at 13:35

## 2017-07-15 RX ADMIN — ATORVASTATIN CALCIUM 10 MILLIGRAM(S): 80 TABLET, FILM COATED ORAL at 22:29

## 2017-07-15 RX ADMIN — ENOXAPARIN SODIUM 100 MILLIGRAM(S): 100 INJECTION SUBCUTANEOUS at 17:30

## 2017-07-15 RX ADMIN — Medication 32.4 MILLIGRAM(S): at 06:13

## 2017-07-15 RX ADMIN — Medication 100 MILLIGRAM(S): at 00:00

## 2017-07-15 RX ADMIN — TAMSULOSIN HYDROCHLORIDE 0.4 MILLIGRAM(S): 0.4 CAPSULE ORAL at 22:29

## 2017-07-15 RX ADMIN — Medication 25 MICROGRAM(S): at 06:14

## 2017-07-15 RX ADMIN — WARFARIN SODIUM 5 MILLIGRAM(S): 2.5 TABLET ORAL at 22:29

## 2017-07-15 RX ADMIN — Medication 100 MILLIGRAM(S): at 06:14

## 2017-07-15 RX ADMIN — Medication 100 MILLIGRAM(S): at 17:30

## 2017-07-15 RX ADMIN — Medication 32.4 MILLIGRAM(S): at 17:32

## 2017-07-15 RX ADMIN — AMIODARONE HYDROCHLORIDE 200 MILLIGRAM(S): 400 TABLET ORAL at 06:14

## 2017-07-16 DIAGNOSIS — E55.9 VITAMIN D DEFICIENCY, UNSPECIFIED: ICD-10-CM

## 2017-07-16 LAB
APTT BLD: 62.4 SEC — HIGH (ref 27.5–37.4)
INR BLD: 2.04 RATIO — HIGH (ref 0.88–1.16)
PROTHROM AB SERPL-ACNC: 22.6 SEC — HIGH (ref 9.8–12.7)

## 2017-07-16 RX ADMIN — WARFARIN SODIUM 5 MILLIGRAM(S): 2.5 TABLET ORAL at 21:32

## 2017-07-16 RX ADMIN — Medication 100 MILLIGRAM(S): at 13:09

## 2017-07-16 RX ADMIN — Medication 32.4 MILLIGRAM(S): at 05:45

## 2017-07-16 RX ADMIN — Medication 100 MILLIGRAM(S): at 21:32

## 2017-07-16 RX ADMIN — ENOXAPARIN SODIUM 100 MILLIGRAM(S): 100 INJECTION SUBCUTANEOUS at 05:41

## 2017-07-16 RX ADMIN — AMIODARONE HYDROCHLORIDE 200 MILLIGRAM(S): 400 TABLET ORAL at 05:42

## 2017-07-16 RX ADMIN — ATORVASTATIN CALCIUM 10 MILLIGRAM(S): 80 TABLET, FILM COATED ORAL at 21:32

## 2017-07-16 RX ADMIN — TAMSULOSIN HYDROCHLORIDE 0.4 MILLIGRAM(S): 0.4 CAPSULE ORAL at 21:32

## 2017-07-16 RX ADMIN — Medication 32.4 MILLIGRAM(S): at 17:36

## 2017-07-16 RX ADMIN — ENOXAPARIN SODIUM 100 MILLIGRAM(S): 100 INJECTION SUBCUTANEOUS at 17:33

## 2017-07-16 RX ADMIN — Medication 25 MICROGRAM(S): at 05:42

## 2017-07-16 RX ADMIN — Medication 100 MILLIGRAM(S): at 05:42

## 2017-07-16 NOTE — PROGRESS NOTE ADULT - PROBLEM SELECTOR PROBLEM 1
DVT (deep venous thrombosis)
Acute deep vein thrombosis (DVT) of popliteal vein of left lower extremity

## 2017-07-16 NOTE — PROGRESS NOTE ADULT - SUBJECTIVE AND OBJECTIVE BOX
DVT, On Coumadin and lovenox  Today is the first day of therapeutic INR.  He received coumadin at the dose of 5/5/7.5/5/5 for the last five days    ROS:  No headaches, no chest pain, no nausea vomiting or diarrhea          MEDICATIONS  (STANDING):  enoxaparin Injectable 100 milliGRAM(s) SubCutaneous every 12 hours  phenytoin   Capsule 100 milliGRAM(s) Oral <User Schedule>  phenytoin   Capsule 100 milliGRAM(s) Oral <User Schedule>  amiodarone    Tablet 200 milliGRAM(s) Oral daily  atorvastatin 10 milliGRAM(s) Oral at bedtime  tamsulosin 0.4 milliGRAM(s) Oral at bedtime  levothyroxine 25 MICROGram(s) Oral daily  metoprolol 100 milliGRAM(s) Oral two times a day  PHENobarbital 32.4 milliGRAM(s) Oral two times a day  ergocalciferol 97565 Unit(s) Oral every week  warfarin 5 milliGRAM(s) Oral daily    MEDICATIONS  (PRN):  acetaminophen   Tablet. 650 milliGRAM(s) Oral every 6 hours PRN Mild Pain (1 - 3)      Allergies    No Known Allergies    Intolerances        Vital Signs Last 24 Hrs  T(C): 36.6 (16 Jul 2017 05:16), Max: 36.8 (15 Jul 2017 14:55)  T(F): 97.8 (16 Jul 2017 05:16), Max: 98.3 (15 Jul 2017 14:55)  HR: 75 (16 Jul 2017 05:16) (63 - 75)  BP: 120/81 (16 Jul 2017 05:16) (99/66 - 124/80)  BP(mean): --  RR: 16 (16 Jul 2017 05:16) (16 - 16)  SpO2: 96% (16 Jul 2017 05:16) (96% - 96%)    PHYSICAL EXAM  General: adult in NAD  HEENT: clear oropharynx, anicteric sclera, pink conjunctiva  Neck: supple  CV: normal S1/S2 with no murmur rubs or gallops  Lungs: positive air movement b/l ant lungs,clear to auscultation, no wheezes, no rales  Abdomen: soft non-tender non-distended, no hepatosplenomegaly  Ext: no clubbing cyanosis or edema  Skin: no rashes and no petechiae  Neuro: alert and oriented X 4, no focal deficits      LABS:        PT/INR - ( 16 Jul 2017 08:35 )   PT: 22.6 sec;   INR: 2.04 ratio         PTT - ( 16 Jul 2017 08:35 )  PTT:62.4 sec    Folate, Serum: 18.7 ng/mL (07-11 @ 09:23)  Vitamin B12, Serum: 363 pg/mL (07-11 @ 09:23)

## 2017-07-16 NOTE — DIETITIAN INITIAL EVALUATION ADULT. - OTHER INFO
Verbal request by RN to see pt. for coumadin/Vitamin K interaction. Patient from home lives with mother & brother. Visited pt. reports appetite good, eats 3 meals daily, denies nausea/vomiting or diarrhea or constipation PTA, stated  Lbs & have gained wt. ~20 Lbs since last surgery , in house consuming >50% of meals & tolerating, obtained food preferences. Per pt. need diet / education on coumadin & vit. k foods interaction, pt. stated concerns on amount of veges with high vit. K to have at meals times, explained & give copy of Coumadin/Vit. K rational & food list & reinforce, pt. receptive to information given. D/W RN, skin intact. Verbal request by RN to see pt. for coumadin/Vitamin K interaction. Patient from home lives with mother & brother. Visited pt. reports appetite good, eats 3 meals daily, denies nausea/vomiting or diarrhea or constipation PTA, stated  Lbs & have gained wt. ~20 Lbs since last surgery , in house consuming >50% of meals & tolerating, obtained food preferences. Per pt. need diet / education on coumadin & vit. k foods interaction, pt. stated concerns on amount of veges with high vit. K to have at meals times, explained & give copy of Coumadin/Vit. K rational & food list, reinforce, pt. receptive to information given. D/W RN, skin intact.

## 2017-07-16 NOTE — PROGRESS NOTE ADULT - SUBJECTIVE AND OBJECTIVE BOX
HPI:  Patient is a 67M, from home, lives with mother and brother, unsteady gait at baseline, with PMH seizure d/o, dupuytren's contracture bilaterally with three prior repairs, HLD, hx "leaky valve" s/p mitral valve replacement 2011 (reports being on amiodarone after surgery) 2/2 severe MR with ruptured tendinae, pericardial effusion drainage in 2011, presenting due to 1 day Hx L leg pain, referred by PMD to come to ED. Patient endorses family history of DVT- mother has 3 DVTs. He denies fever, chills, SOB, palpitations, chest pain, recent travel or immobilization, current cancer, nausea, vomiting, diarrhea or constipation.   started on lovenox/coumadin  hem f/up noted  PMH: as per HPI  Surgery Hx: Benign Tumor of Short Bones of Lower Limb  removed, Hand(s) Dupuytrens Contracture  repair x 3 bilaterally, History of Tonsillectomy, S/P Colonoscopic Polypectomy  Fam Hx: family history of DVT  Social Hx: negative for smoking, EtOH or illicit drugs  Allergies: NKDA (11 Jul 2017 00:46)      Meds:    Allergies:  Allergies    No Known Allergies    Intolerances        REVIEW OF SYSTEMS:  feels ok  no new sym  no cp/no sob  leg pain resolving  CONSTITUTIONAL: No weakness, fevers or chills  EYES/ENT: No visual changes;  No vertigo or throat pain   NECK: No pain or stiffness  RESPIRATORY: No cough, wheezing, hemoptysis; No shortness of breath  CARDIOVASCULAR: No chest pain or palpitations  GASTROINTESTINAL: No abdominal or epigastric pain. No nausea, vomiting, or hematemesis; No diarrhea or constipation. No melena or hematochezia.  GENITOURINARY: No dysuria, frequency or hematuria  NEUROLOGICAL: No numbness or weakness  SKIN: No itching, burning, rashes, or lesions   All other review of systems is negative unless indicated above.    PHYSICAL EXAM:    Vital Signs Last 24 Hrs  T(C): 36.8 (16 Jul 2017 14:34), Max: 36.8 (15 Jul 2017 22:00)  T(F): 98.3 (16 Jul 2017 14:34), Max: 98.3 (16 Jul 2017 14:34)  HR: 56 (16 Jul 2017 14:34) (56 - 75)  BP: 116/72 (16 Jul 2017 14:34) (116/72 - 124/80)  BP(mean): --  RR: 16 (16 Jul 2017 14:34) (16 - 16)  SpO2: 98% (16 Jul 2017 14:34) (96% - 98%)    HEENT:  awake alert  in nad  lungs ae fair  cardia reg  abd soft  lle  no swelling/no erythma  Neck:  [  ] Supple  [  ] Lymphadenopathy  [  ] JVD  [  ] Masses  [  ] WNL    CHEST/Respiratory: [ ] Clear to auscultation     [  ] Rales      [  ] Rhonchi      [  ] Wheezing       [  ] Chest Tenderness     [  ] WNL    Cardiovascular:  [  ]S1 S2  [  ] Reg  [  ] Irreg   [  ] No Murmurs   [  ] Murmurs  [  ] Systolic [  ] Diastolic    Gastrointestinal:  [  ] Bowel Sounds  [   ] ABD Soft  [  ] ABD Distention   [  ] No Tenderness [  ] Tenderness  [  ] Organomegaly  [  ] Guarding rigidity  [  ] No Guarding rigidity  [  ] Rebound Tenderness [  ] No Rebound Tenderness    Extremities: [  ] Edema  [  ] No Edema  [  ] Clubbing   [  ] Cyanosis  [  ] Palpable peripheral pulses                          [  ] Tender calf muscles    [  ] No Tender Calf Muscles    Neurological:  [  ] Alert  [  ] Awake  [  ] Oriented  x                              [  ] Focal weakness  [  ] No Focal Weakness    Skin:  [  ] Thrombophlebitis  [  ] Rashes  [  ] Dry  [  ] Ulcers    Ortho:  [  ] Joint Swelling  [  ] Joint erythema [  ] DJD [  ] Increased Temperature to Touch      LABS/DIAGNOSTIC TESTS                  CAPILLARY BLOOD GLUCOSE            Hemoglobin A1C, Whole Blood: 5.3 % (07-11 @ 09:38)    Thyroid Stimulating Hormone, Serum: 2.52 uU/mL (07-11 @ 07:09)    CULTURES:       RADIOLOGY  CXR:    enoxaparin Injectable 100 milliGRAM(s) SubCutaneous every 12 hours  acetaminophen   Tablet. 650 milliGRAM(s) Oral every 6 hours PRN  phenytoin   Capsule 100 milliGRAM(s) Oral <User Schedule>  phenytoin   Capsule 100 milliGRAM(s) Oral <User Schedule>  amiodarone    Tablet 200 milliGRAM(s) Oral daily  atorvastatin 10 milliGRAM(s) Oral at bedtime  tamsulosin 0.4 milliGRAM(s) Oral at bedtime  levothyroxine 25 MICROGram(s) Oral daily  metoprolol 100 milliGRAM(s) Oral two times a day  PHENobarbital 32.4 milliGRAM(s) Oral two times a day  ergocalciferol 17323 Unit(s) Oral every week  warfarin 5 milliGRAM(s) Oral daily

## 2017-07-16 NOTE — PROGRESS NOTE ADULT - ASSESSMENT
1.dvt  lovenox/coumadin   inr 2  hem f/up noted  d/c am if inr more than 2 as per hem  d/w pt      2.s/p mv repair  cont meds as per cardio  3.prostate ca  s/p radiation

## 2017-07-16 NOTE — PROGRESS NOTE ADULT - PROBLEM SELECTOR PLAN 1
Therapeutic INR for the first day. Recommend continue lovenox for at least one more day and if tomorrow's INR is also above 2, patient may be discharged on the current dose of 5 mg. Patient instructed to see pmd in one week to check the INR as outpatient dose usually changes as the diet changes. Hypercoaguable work up is grossly non contributory

## 2017-07-16 NOTE — DIETITIAN INITIAL EVALUATION ADULT. - NS AS NUTRI INTERV ED CONTENT
Nutrition relationship to health/disease/Recommended modifications/Purpose of the nutrition education/give education & copy of coumadin & Vit. K interaction

## 2017-07-17 ENCOUNTER — TRANSCRIPTION ENCOUNTER (OUTPATIENT)
Age: 67
End: 2017-07-17

## 2017-07-17 VITALS
SYSTOLIC BLOOD PRESSURE: 113 MMHG | TEMPERATURE: 99 F | OXYGEN SATURATION: 98 % | RESPIRATION RATE: 16 BRPM | HEART RATE: 64 BPM | DIASTOLIC BLOOD PRESSURE: 70 MMHG

## 2017-07-17 LAB
INR BLD: 2.19 RATIO — HIGH (ref 0.88–1.16)
PROTHROM AB SERPL-ACNC: 24.3 SEC — HIGH (ref 9.8–12.7)

## 2017-07-17 PROCEDURE — 80186 ASSAY OF PHENYTOIN FREE: CPT

## 2017-07-17 PROCEDURE — 93971 EXTREMITY STUDY: CPT

## 2017-07-17 PROCEDURE — 99285 EMERGENCY DEPT VISIT HI MDM: CPT | Mod: 25

## 2017-07-17 PROCEDURE — 84100 ASSAY OF PHOSPHORUS: CPT

## 2017-07-17 PROCEDURE — 86038 ANTINUCLEAR ANTIBODIES: CPT

## 2017-07-17 PROCEDURE — 86147 CARDIOLIPIN ANTIBODY EA IG: CPT

## 2017-07-17 PROCEDURE — 84443 ASSAY THYROID STIM HORMONE: CPT

## 2017-07-17 PROCEDURE — 93005 ELECTROCARDIOGRAM TRACING: CPT

## 2017-07-17 PROCEDURE — 80184 ASSAY OF PHENOBARBITAL: CPT

## 2017-07-17 PROCEDURE — 83090 ASSAY OF HOMOCYSTEINE: CPT

## 2017-07-17 PROCEDURE — 83036 HEMOGLOBIN GLYCOSYLATED A1C: CPT

## 2017-07-17 PROCEDURE — 82746 ASSAY OF FOLIC ACID SERUM: CPT

## 2017-07-17 PROCEDURE — 86225 DNA ANTIBODY NATIVE: CPT

## 2017-07-17 PROCEDURE — 85300 ANTITHROMBIN III ACTIVITY: CPT

## 2017-07-17 PROCEDURE — 96372 THER/PROPH/DIAG INJ SC/IM: CPT

## 2017-07-17 PROCEDURE — 85730 THROMBOPLASTIN TIME PARTIAL: CPT

## 2017-07-17 PROCEDURE — 80048 BASIC METABOLIC PNL TOTAL CA: CPT

## 2017-07-17 PROCEDURE — 81240 F2 GENE: CPT

## 2017-07-17 PROCEDURE — 80185 ASSAY OF PHENYTOIN TOTAL: CPT

## 2017-07-17 PROCEDURE — 80053 COMPREHEN METABOLIC PANEL: CPT

## 2017-07-17 PROCEDURE — 36415 COLL VENOUS BLD VENIPUNCTURE: CPT

## 2017-07-17 PROCEDURE — 85610 PROTHROMBIN TIME: CPT

## 2017-07-17 PROCEDURE — 85027 COMPLETE CBC AUTOMATED: CPT

## 2017-07-17 PROCEDURE — 74177 CT ABD & PELVIS W/CONTRAST: CPT

## 2017-07-17 PROCEDURE — 83735 ASSAY OF MAGNESIUM: CPT

## 2017-07-17 PROCEDURE — 82607 VITAMIN B-12: CPT

## 2017-07-17 PROCEDURE — 81291 MTHFR GENE: CPT

## 2017-07-17 PROCEDURE — 81241 F5 GENE: CPT

## 2017-07-17 PROCEDURE — 80061 LIPID PANEL: CPT

## 2017-07-17 PROCEDURE — 82306 VITAMIN D 25 HYDROXY: CPT

## 2017-07-17 PROCEDURE — 86146 BETA-2 GLYCOPROTEIN ANTIBODY: CPT

## 2017-07-17 RX ORDER — WARFARIN SODIUM 2.5 MG/1
5 TABLET ORAL ONCE
Qty: 0 | Refills: 0 | Status: DISCONTINUED | OUTPATIENT
Start: 2017-07-17 | End: 2017-07-17

## 2017-07-17 RX ORDER — WARFARIN SODIUM 2.5 MG/1
1 TABLET ORAL
Qty: 15 | Refills: 0
Start: 2017-07-17 | End: 2017-08-01

## 2017-07-17 RX ORDER — CHOLECALCIFEROL (VITAMIN D3) 125 MCG
1 CAPSULE ORAL
Qty: 30 | Refills: 0
Start: 2017-07-17 | End: 2017-08-16

## 2017-07-17 RX ORDER — WARFARIN SODIUM 2.5 MG/1
1 TABLET ORAL
Qty: 0 | Refills: 0 | DISCHARGE
Start: 2017-07-17 | End: 2017-08-01

## 2017-07-17 RX ORDER — ATORVASTATIN CALCIUM 80 MG/1
1 TABLET, FILM COATED ORAL
Qty: 0 | Refills: 0 | DISCHARGE
Start: 2017-07-17

## 2017-07-17 RX ADMIN — AMIODARONE HYDROCHLORIDE 200 MILLIGRAM(S): 400 TABLET ORAL at 05:47

## 2017-07-17 RX ADMIN — Medication 25 MICROGRAM(S): at 05:47

## 2017-07-17 RX ADMIN — Medication 100 MILLIGRAM(S): at 05:47

## 2017-07-17 RX ADMIN — ENOXAPARIN SODIUM 100 MILLIGRAM(S): 100 INJECTION SUBCUTANEOUS at 05:47

## 2017-07-17 RX ADMIN — Medication 100 MILLIGRAM(S): at 14:54

## 2017-07-17 RX ADMIN — Medication 32.4 MILLIGRAM(S): at 05:46

## 2017-07-17 NOTE — DISCHARGE NOTE ADULT - PATIENT PORTAL LINK FT
“You can access the FollowHealth Patient Portal, offered by [facility], by registering with the following website: [email or web address]”

## 2017-07-17 NOTE — PROGRESS NOTE ADULT - SUBJECTIVE AND OBJECTIVE BOX
7:30am 6S    Orig  HPI:  Patient is a 67M with PMH seizure  HLD, hx mitral valve replacement 2011 (reports being on amiodarone after surgery) had severe MR with ruptured tendinae,  presenting  due to 1 day Hx L leg pain, referred by PMD to come to ED.  He denies fever, chills, SOB, palpitations, chest pain, recent travel or immobilization, current cancer, nausea, vomiting, diarrhea or constipation. Pt has no coronary hx and at time of MVR no revasc intervention recommended  Pt repeatedly questioned as to why taking Amio, does not know, not ever told of AFib, other cardiac arrhythmias, never took AC therapy before.    PMH   Seizure Dis since childhood  Prostate CA, s/p seed implants [id number]    Surgery Hx: Benign Tumor of Short Bones of Lower Limb  removed, Hand(s) Dupuytrens Contracture  repair x 3 bilaterally, History of Tonsillectomy, S/P Colonoscopic Polypectomy  Fam Hx: family history of DVT  Social Hx: negative for smoking, EtOH or illicit drugs    Allergies: NKDA (11 Jul 2017 00:46)    PRE-ADM MEDS: as per res adm note    SURGERIES: dupuytren's contracture bilaterally with three prior repairs    ROS: non-contributory except as outlined above.      EXAM:  In NAD    Vital Signs Last 24 Hrs  T(C): 36.6 (11 Jul 2017 14:28), Max: 36.6 (11 Jul 2017 14:28)  T(F): 97.8 (11 Jul 2017 14:28), Max: 97.8 (11 Jul 2017 14:28)  HR: 69 (11 Jul 2017 17:18) (57 - 69)  BP: 127/76 (11 Jul 2017 17:18) (122/58 - 129/65)  BP(mean): --  RR: 16 (11 Jul 2017 14:28) (16 - 17)  SpO2: 96% (11 Jul 2017 14:28) (96% - 97%)  Daily          HEENT: no icterus  NECK: no JVD/HJR, nl c. pulses w/o bruits LUNS: clear  COR: inc s1/ nl s2, no m, g, rub  ABD: nl BS, soft, not tender, no organomeg/masses  LE: no edema, periperal pulses not palpable    EKG: on adm NSR, 1st deg AVB, otherwise unremarkable    US Duplex Venous Lower Ext Ltd, Left (07.10.17 @ 15:41) >  Left popliteal and peroneal vein nonocclusive DVT.    CT Abdomen and Pelvis w/ IV Cont (07.10.17 @ 18:26)   < from: CT Abdomen and Pelvis w/ IV Cont (07.10.17 @ 18:26) >  Atelectasis lung bases. Cardiomegaly noted  No gross DVT in the IVC, common iliac and external iliac veins.     2Decho: not performe on this adm    LABS:   last PT INR 2.1    MEDICATIONS  (STANDING):    enoxaparin Injectable 100 milliGRAM(s) SubCutaneous every 12 hours  phenytoin   Capsule 100 milliGRAM(s) Oral <User Schedule>  phenytoin   Capsule 100 milliGRAM(s) Oral <User Schedule>  amiodarone    Tablet 200 milliGRAM(s) Oral daily  atorvastatin 10 milliGRAM(s) Oral at bedtime  tamsulosin 0.4 milliGRAM(s) Oral at bedtime  levothyroxine 25 MICROGram(s) Oral daily  metoprolol 100 milliGRAM(s) Oral two times a day  PHENobarbital 32.4 milliGRAM(s) Oral two times a day

## 2017-07-17 NOTE — DISCHARGE NOTE ADULT - CARE PLAN
Principal Discharge DX:	DVT (deep venous thrombosis)  Goal:	continue with anti-coagulation  Instructions for follow-up, activity and diet:	continue with medications and f/u INR

## 2017-07-17 NOTE — DISCHARGE NOTE ADULT - HOSPITAL COURSE
Patient is a 67M, from home, lives with mother and brother, unsteady gait at baseline, with PMH seizure d/o, dupuytren's contracture bilaterally with three prior repairs, HLD, hx "leaky valve" s/p mitral valve replacement 2011 2/2 severe MR with ruptured tendinae, pericardial effusion drainage in 2011, presenting due to 1 day Hx L leg pain, referred by PMD to come to ED. Admitted for Left popliteal and peroneal vein nonocclusive DVT.     DVT (deep venous thrombosis).   L LE doppler: Left popliteal and peroneal vein nonocclusive DVT  unprovoked DVT- family Hx  Bridged coumadin with full dose lovenox   Therapuetic INR for 2 days  Patient instructed to see pmd in one week to check the INR as outpatient dose usually changes as the diet changes.   Hypercoaguable work up is grossly non contributory.   Pt on dilantin and phenobarbital and has a high risk of interaction with novel AC     Seizure Disorder.   hx seizures since childhood  c/w dilantin and phenobarbital.      Hyperlipemia.  Plan: c/w lipitor.     Patient stable for discharge per attending . F/u PMD in 3-5 days. Patient educated about importance of medication compliance & physician follow up.All concerns & questions have been appropriately adressed.

## 2017-07-17 NOTE — DISCHARGE NOTE ADULT - MEDICATION SUMMARY - MEDICATIONS TO TAKE
I will START or STAY ON the medications listed below when I get home from the hospital:    Flomax 0.4 mg oral capsule  -- 1 cap(s) by mouth once a day  -- Indication: For BPH    Dilantin 100 mg oral capsule  -- 1 tab(s) by mouth 4 times a day (T, Th, Sat)  -- Indication: For Seizure    Dilantin 100 mg oral capsule  -- 1 tab(s) by mouth 3 times a day (M, W, F, Sun)  -- Indication: For Seizure Disorder    amiodarone 200 mg oral tablet  -- 1 tab(s) by mouth once a day  -- Indication: For Arrythmia [unknown]    Coumadin 5 mg oral tablet  -- 1 tab(s) by mouth once a day  -- Do not take this drug if you are pregnant.  It is very important that you take or use this exactly as directed.  Do not skip doses or discontinue unless directed by your doctor.  Obtain medical advice before taking any non-prescription drugs as some may affect the action of this medication.    -- Indication: For DVT (deep venous thrombosis)    PHENobarbital 30 mg oral tablet  -- 1 tab(s) by mouth 2 times a day  -- Indication: For Seizure Disorder    atorvastatin 10 mg oral tablet  -- 1 tab(s) by mouth once a day (at bedtime)  -- Indication: For HLD    Lopressor 100 mg oral tablet  -- 1 tab(s) by mouth 2 times a day  -- Indication: For HTN    Synthroid 25 mcg (0.025 mg) oral tablet  -- 1 tab(s) by mouth once a day  -- Indication: For Hypothyroidism    cholecalciferol 1000 intl units oral tablet, chewable  -- 1 tab(s) by mouth once a day  -- Check with your doctor before becoming pregnant.    -- Indication: For Supplement

## 2017-07-17 NOTE — PROGRESS NOTE ADULT - PROVIDER SPECIALTY LIST ADULT
Cardiology
Heme/Onc
Internal Medicine

## 2017-07-17 NOTE — DISCHARGE NOTE ADULT - VISION (WITH CORRECTIVE LENSES IF THE PATIENT USUALLY WEARS THEM):
but pt wears reading glasses/Normal vision: sees adequately in most situations; can see medication labels, newsprint

## 2017-07-17 NOTE — PROGRESS NOTE ADULT - ASSESSMENT
A/P    1. s/p unprovoked DVT LLE >> at least 6mos of AC, maybe indefinite. Discussed AC with heme/onc agree Warfarin better choice given the interaction of all DOACs with pt's Dilantin  2. Clinically stable CV status  3. s/p MV repair with intact function based on exam  4. Unclear why on Amio  unless at one time started for afib suppression but pt now 6 yrs post OHS/MVRepair. Will review issue with his pvt cardiologist.  5. Can get 2Decho if not done on the outside within the last year.

## 2017-07-19 DIAGNOSIS — G40.909 EPILEPSY, UNSPECIFIED, NOT INTRACTABLE, WITHOUT STATUS EPILEPTICUS: ICD-10-CM

## 2017-07-19 DIAGNOSIS — E55.9 VITAMIN D DEFICIENCY, UNSPECIFIED: ICD-10-CM

## 2017-07-19 DIAGNOSIS — Z85.46 PERSONAL HISTORY OF MALIGNANT NEOPLASM OF PROSTATE: ICD-10-CM

## 2017-07-19 DIAGNOSIS — N40.0 BENIGN PROSTATIC HYPERPLASIA WITHOUT LOWER URINARY TRACT SYMPTOMS: ICD-10-CM

## 2017-07-19 DIAGNOSIS — E78.5 HYPERLIPIDEMIA, UNSPECIFIED: ICD-10-CM

## 2017-07-19 DIAGNOSIS — I34.0 NONRHEUMATIC MITRAL (VALVE) INSUFFICIENCY: ICD-10-CM

## 2017-07-19 DIAGNOSIS — I82.4Z2 ACUTE EMBOLISM AND THROMBOSIS OF UNSPECIFIED DEEP VEINS OF LEFT DISTAL LOWER EXTREMITY: ICD-10-CM

## 2017-07-19 DIAGNOSIS — E03.9 HYPOTHYROIDISM, UNSPECIFIED: ICD-10-CM

## 2017-07-19 DIAGNOSIS — Z95.2 PRESENCE OF PROSTHETIC HEART VALVE: ICD-10-CM

## 2017-07-19 DIAGNOSIS — I82.432 ACUTE EMBOLISM AND THROMBOSIS OF LEFT POPLITEAL VEIN: ICD-10-CM

## 2021-01-06 ENCOUNTER — EMERGENCY (EMERGENCY)
Facility: HOSPITAL | Age: 71
LOS: 1 days | Discharge: HOME | End: 2021-01-06
Attending: STUDENT IN AN ORGANIZED HEALTH CARE EDUCATION/TRAINING PROGRAM
Payer: MEDICARE

## 2021-01-06 VITALS
RESPIRATION RATE: 17 BRPM | TEMPERATURE: 97 F | DIASTOLIC BLOOD PRESSURE: 76 MMHG | HEART RATE: 72 BPM | SYSTOLIC BLOOD PRESSURE: 141 MMHG | OXYGEN SATURATION: 97 %

## 2021-01-06 VITALS
HEIGHT: 78 IN | TEMPERATURE: 99 F | DIASTOLIC BLOOD PRESSURE: 85 MMHG | OXYGEN SATURATION: 95 % | RESPIRATION RATE: 18 BRPM | SYSTOLIC BLOOD PRESSURE: 148 MMHG | HEART RATE: 68 BPM

## 2021-01-06 LAB
ALBUMIN SERPL ELPH-MCNC: 3.4 G/DL — LOW (ref 3.5–5)
ALP SERPL-CCNC: 78 U/L — SIGNIFICANT CHANGE UP (ref 40–120)
ALT FLD-CCNC: 34 U/L DA — SIGNIFICANT CHANGE UP (ref 10–60)
ANION GAP SERPL CALC-SCNC: 6 MMOL/L — SIGNIFICANT CHANGE UP (ref 5–17)
AST SERPL-CCNC: 24 U/L — SIGNIFICANT CHANGE UP (ref 10–40)
BASOPHILS # BLD AUTO: 0.04 K/UL — SIGNIFICANT CHANGE UP (ref 0–0.2)
BASOPHILS NFR BLD AUTO: 0.6 % — SIGNIFICANT CHANGE UP (ref 0–2)
BILIRUB SERPL-MCNC: 0.4 MG/DL — SIGNIFICANT CHANGE UP (ref 0.2–1.2)
BUN SERPL-MCNC: 15 MG/DL — SIGNIFICANT CHANGE UP (ref 7–18)
CALCIUM SERPL-MCNC: 8.3 MG/DL — LOW (ref 8.4–10.5)
CHLORIDE SERPL-SCNC: 105 MMOL/L — SIGNIFICANT CHANGE UP (ref 96–108)
CO2 SERPL-SCNC: 25 MMOL/L — SIGNIFICANT CHANGE UP (ref 22–31)
CREAT SERPL-MCNC: 0.88 MG/DL — SIGNIFICANT CHANGE UP (ref 0.5–1.3)
EOSINOPHIL # BLD AUTO: 0.17 K/UL — SIGNIFICANT CHANGE UP (ref 0–0.5)
EOSINOPHIL NFR BLD AUTO: 2.6 % — SIGNIFICANT CHANGE UP (ref 0–6)
GLUCOSE SERPL-MCNC: 96 MG/DL — SIGNIFICANT CHANGE UP (ref 70–99)
HCT VFR BLD CALC: 42.4 % — SIGNIFICANT CHANGE UP (ref 39–50)
HGB BLD-MCNC: 14 G/DL — SIGNIFICANT CHANGE UP (ref 13–17)
IMM GRANULOCYTES NFR BLD AUTO: 0.3 % — SIGNIFICANT CHANGE UP (ref 0–1.5)
LYMPHOCYTES # BLD AUTO: 1.49 K/UL — SIGNIFICANT CHANGE UP (ref 1–3.3)
LYMPHOCYTES # BLD AUTO: 22.5 % — SIGNIFICANT CHANGE UP (ref 13–44)
MAGNESIUM SERPL-MCNC: 2.2 MG/DL — SIGNIFICANT CHANGE UP (ref 1.6–2.6)
MCHC RBC-ENTMCNC: 31.2 PG — SIGNIFICANT CHANGE UP (ref 27–34)
MCHC RBC-ENTMCNC: 33 GM/DL — SIGNIFICANT CHANGE UP (ref 32–36)
MCV RBC AUTO: 94.4 FL — SIGNIFICANT CHANGE UP (ref 80–100)
MONOCYTES # BLD AUTO: 0.74 K/UL — SIGNIFICANT CHANGE UP (ref 0–0.9)
MONOCYTES NFR BLD AUTO: 11.2 % — SIGNIFICANT CHANGE UP (ref 2–14)
NEUTROPHILS # BLD AUTO: 4.16 K/UL — SIGNIFICANT CHANGE UP (ref 1.8–7.4)
NEUTROPHILS NFR BLD AUTO: 62.8 % — SIGNIFICANT CHANGE UP (ref 43–77)
NRBC # BLD: 0 /100 WBCS — SIGNIFICANT CHANGE UP (ref 0–0)
PLATELET # BLD AUTO: 232 K/UL — SIGNIFICANT CHANGE UP (ref 150–400)
POTASSIUM SERPL-MCNC: 4.1 MMOL/L — SIGNIFICANT CHANGE UP (ref 3.5–5.3)
POTASSIUM SERPL-SCNC: 4.1 MMOL/L — SIGNIFICANT CHANGE UP (ref 3.5–5.3)
PROT SERPL-MCNC: 6.9 G/DL — SIGNIFICANT CHANGE UP (ref 6–8.3)
RBC # BLD: 4.49 M/UL — SIGNIFICANT CHANGE UP (ref 4.2–5.8)
RBC # FLD: 13.2 % — SIGNIFICANT CHANGE UP (ref 10.3–14.5)
SODIUM SERPL-SCNC: 136 MMOL/L — SIGNIFICANT CHANGE UP (ref 135–145)
WBC # BLD: 6.62 K/UL — SIGNIFICANT CHANGE UP (ref 3.8–10.5)
WBC # FLD AUTO: 6.62 K/UL — SIGNIFICANT CHANGE UP (ref 3.8–10.5)

## 2021-01-06 PROCEDURE — 70498 CT ANGIOGRAPHY NECK: CPT

## 2021-01-06 PROCEDURE — 70498 CT ANGIOGRAPHY NECK: CPT | Mod: 26

## 2021-01-06 PROCEDURE — 85025 COMPLETE CBC W/AUTO DIFF WBC: CPT

## 2021-01-06 PROCEDURE — 83735 ASSAY OF MAGNESIUM: CPT

## 2021-01-06 PROCEDURE — 70496 CT ANGIOGRAPHY HEAD: CPT | Mod: 26

## 2021-01-06 PROCEDURE — 99284 EMERGENCY DEPT VISIT MOD MDM: CPT | Mod: 25

## 2021-01-06 PROCEDURE — 70496 CT ANGIOGRAPHY HEAD: CPT

## 2021-01-06 PROCEDURE — 36415 COLL VENOUS BLD VENIPUNCTURE: CPT

## 2021-01-06 PROCEDURE — 99284 EMERGENCY DEPT VISIT MOD MDM: CPT

## 2021-01-06 PROCEDURE — 93005 ELECTROCARDIOGRAM TRACING: CPT

## 2021-01-06 PROCEDURE — 80053 COMPREHEN METABOLIC PANEL: CPT

## 2021-01-06 RX ORDER — LEVOTHYROXINE SODIUM 125 MCG
1 TABLET ORAL
Qty: 0 | Refills: 0 | DISCHARGE

## 2021-01-06 RX ORDER — METOPROLOL TARTRATE 50 MG
1 TABLET ORAL
Qty: 0 | Refills: 0 | DISCHARGE

## 2021-01-06 RX ORDER — PHENOBARBITAL 60 MG
1 TABLET ORAL
Qty: 0 | Refills: 0 | DISCHARGE

## 2021-01-06 RX ORDER — AMIODARONE HYDROCHLORIDE 400 MG/1
1 TABLET ORAL
Qty: 0 | Refills: 0 | DISCHARGE

## 2021-01-06 NOTE — ED PROVIDER NOTE - CLINICAL SUMMARY MEDICAL DECISION MAKING FREE TEXT BOX
70M presenting with unsteady gait and headache. symptoms preceded fall. non-focal neuro exam. concern for CVA. will get labs, CTA head. will reassess.

## 2021-01-06 NOTE — ED PROVIDER NOTE - PHYSICAL EXAMINATION
General: well appearing male, no acute distress   HEENT: normocephalic, atraumatic, PERRL, EOMI  Respiratory: normal work of breathing, lungs clear to auscultation bilaterally   Cardiac: regular rate and rhythm   Abdomen: soft, non-tender, no guarding or rebound   MSK: no swelling or tenderness of lower extremities, moving all extremities spontaneously   Skin: warm, dry   Neuro: A&Ox3, cranial nerves II-XII intact, 5/5 strength in all extremities, no sensory deficits, normal finger-to-nose   Psych: appropriate affect

## 2021-01-06 NOTE — ED PROVIDER NOTE - NSFOLLOWUPINSTRUCTIONS_ED_ALL_ED_FT
You were seen in the emergency department for an unsteady gait.     Please follow-up with your primary care doctor in the next 24-48 hours.    If you have any worsening symptoms, severe headache, chest pain, shortness of breath, difficulty speaking, facial droop or weakness on one side of your body, please return to the emergency department.

## 2021-01-06 NOTE — PHARMACOTHERAPY INTERVENTION NOTE - COMMENTS
Patient's home pharmacy ([facility] [phone]) was contacted and the Outside Medication Record was updated based on the prescription history.

## 2021-01-06 NOTE — ED PROVIDER NOTE - PATIENT PORTAL LINK FT
You can access the FollowMyHealth Patient Portal offered by [facility] by registering at the following website: [email or web address]. By joining "[employer]’s FollowMyHealth portal, you will also be able to view your health information using other applications (apps) compatible with our system.

## 2021-01-06 NOTE — ED ADULT NURSE NOTE - NSFALLRSKINDICATORS_ED_ALL_ED
A: Met with patient to explain  role and to discuss aftercare options.  R: Reviewed and discussed releases of information to Shira Martel MD (PCP), Brooks Henriquez MD (psychiatry) and 2 additional releases for [facility] contacts.  Patient signed SARIKA's for all of them and stated that he is open to virtual aftercare and has the means to do it through his phone.  P: PCP was notified of Mati's admission via [facility] staff message. Aftercare plan to be completed by time of discharge in collaboration with both the patient and treatment team.  Adam Dallas RN  
no

## 2021-01-06 NOTE — ED PROVIDER NOTE - PROGRESS NOTE DETAILS
patient updated on results. no emergent findings on CT. will discharge with pcp follow-up for MRI. Devaughn Cristina

## 2021-01-06 NOTE — ED PROVIDER NOTE - OBJECTIVE STATEMENT
70M, pmh of htn, epilepsy, presenting with unsteady gait. patient reports 10 days of 70M, pmh of htn, epilepsy, presenting with unsteady gait. patient reports 10 days of left side headache and difficulty walking. reports he woke up on 12/30 with these symptoms and has not improved. had fall four days ago when turning to turn off a light. denies hitting his head or losing consciousness. typically walks on his own but now needs to use a cane or walker. denies changes in vision, nausea, vomiting, chest pain, shortness of breath, abdominal pain, pain or burning with urination, pain or swelling of lower extremities.

## 2021-01-06 NOTE — PHARMACOTHERAPY INTERVENTION NOTE - OUTCOME
other (explain) Discontinue Regimen: Tretinoin 0.025%, spironolactone Plan: Pt request to discontinue mookie at this time.  Will increase strength of topical retinoid Detail Level: Zone Continue Regimen: Apply Onexton daily Initiate Treatment: Apply tazorac nightly as tolerated

## 2021-06-09 ENCOUNTER — NON-APPOINTMENT (OUTPATIENT)
Age: 71
End: 2021-06-09

## 2021-06-09 ENCOUNTER — INPATIENT (INPATIENT)
Facility: HOSPITAL | Age: 71
LOS: 8 days | Discharge: HOME | End: 2021-06-18
Attending: INTERNAL MEDICINE | Admitting: INTERNAL MEDICINE
Payer: MEDICARE

## 2021-06-09 VITALS
HEIGHT: 78 IN | HEART RATE: 73 BPM | DIASTOLIC BLOOD PRESSURE: 68 MMHG | RESPIRATION RATE: 20 BRPM | OXYGEN SATURATION: 97 % | TEMPERATURE: 98 F | SYSTOLIC BLOOD PRESSURE: 130 MMHG

## 2021-06-09 DIAGNOSIS — I82.409 ACUTE EMBOLISM AND THROMBOSIS OF UNSPECIFIED DEEP VEINS OF UNSPECIFIED LOWER EXTREMITY: ICD-10-CM

## 2021-06-09 DIAGNOSIS — R09.89 OTHER SPECIFIED SYMPTOMS AND SIGNS INVOLVING THE CIRCULATORY AND RESPIRATORY SYSTEMS: ICD-10-CM

## 2021-06-09 DIAGNOSIS — I10 ESSENTIAL (PRIMARY) HYPERTENSION: ICD-10-CM

## 2021-06-09 DIAGNOSIS — Z29.9 ENCOUNTER FOR PROPHYLACTIC MEASURES, UNSPECIFIED: ICD-10-CM

## 2021-06-09 DIAGNOSIS — G40.909 EPILEPSY, UNSPECIFIED, NOT INTRACTABLE, WITHOUT STATUS EPILEPTICUS: ICD-10-CM

## 2021-06-09 DIAGNOSIS — I34.0 NONRHEUMATIC MITRAL (VALVE) INSUFFICIENCY: ICD-10-CM

## 2021-06-09 LAB
ALBUMIN SERPL ELPH-MCNC: 4.2 G/DL — SIGNIFICANT CHANGE UP (ref 3.3–5)
ALP SERPL-CCNC: 65 U/L — SIGNIFICANT CHANGE UP (ref 40–120)
ALT FLD-CCNC: 16 U/L — SIGNIFICANT CHANGE UP (ref 4–41)
ANION GAP SERPL CALC-SCNC: 11 MMOL/L — SIGNIFICANT CHANGE UP (ref 7–14)
APTT BLD: 28.9 SEC — SIGNIFICANT CHANGE UP (ref 27–36.3)
AST SERPL-CCNC: 17 U/L — SIGNIFICANT CHANGE UP (ref 4–40)
BASOPHILS # BLD AUTO: 0.02 K/UL — SIGNIFICANT CHANGE UP (ref 0–0.2)
BASOPHILS NFR BLD AUTO: 0.4 % — SIGNIFICANT CHANGE UP (ref 0–2)
BILIRUB SERPL-MCNC: 0.3 MG/DL — SIGNIFICANT CHANGE UP (ref 0.2–1.2)
BUN SERPL-MCNC: 18 MG/DL — SIGNIFICANT CHANGE UP (ref 7–23)
CALCIUM SERPL-MCNC: 8.6 MG/DL — SIGNIFICANT CHANGE UP (ref 8.4–10.5)
CHLORIDE SERPL-SCNC: 103 MMOL/L — SIGNIFICANT CHANGE UP (ref 98–107)
CO2 SERPL-SCNC: 24 MMOL/L — SIGNIFICANT CHANGE UP (ref 22–31)
CREAT SERPL-MCNC: 0.91 MG/DL — SIGNIFICANT CHANGE UP (ref 0.5–1.3)
EOSINOPHIL # BLD AUTO: 0.12 K/UL — SIGNIFICANT CHANGE UP (ref 0–0.5)
EOSINOPHIL NFR BLD AUTO: 2.2 % — SIGNIFICANT CHANGE UP (ref 0–6)
GLUCOSE SERPL-MCNC: 85 MG/DL — SIGNIFICANT CHANGE UP (ref 70–99)
HCT VFR BLD CALC: 40.2 % — SIGNIFICANT CHANGE UP (ref 39–50)
HGB BLD-MCNC: 13.4 G/DL — SIGNIFICANT CHANGE UP (ref 13–17)
IANC: 3.28 K/UL — SIGNIFICANT CHANGE UP (ref 1.5–8.5)
IMM GRANULOCYTES NFR BLD AUTO: 0.4 % — SIGNIFICANT CHANGE UP (ref 0–1.5)
INR BLD: 1.62 RATIO — HIGH (ref 0.88–1.16)
LYMPHOCYTES # BLD AUTO: 1.42 K/UL — SIGNIFICANT CHANGE UP (ref 1–3.3)
LYMPHOCYTES # BLD AUTO: 26.3 % — SIGNIFICANT CHANGE UP (ref 13–44)
MCHC RBC-ENTMCNC: 31.1 PG — SIGNIFICANT CHANGE UP (ref 27–34)
MCHC RBC-ENTMCNC: 33.3 GM/DL — SIGNIFICANT CHANGE UP (ref 32–36)
MCV RBC AUTO: 93.3 FL — SIGNIFICANT CHANGE UP (ref 80–100)
MONOCYTES # BLD AUTO: 0.53 K/UL — SIGNIFICANT CHANGE UP (ref 0–0.9)
MONOCYTES NFR BLD AUTO: 9.8 % — SIGNIFICANT CHANGE UP (ref 2–14)
NEUTROPHILS # BLD AUTO: 3.28 K/UL — SIGNIFICANT CHANGE UP (ref 1.8–7.4)
NEUTROPHILS NFR BLD AUTO: 60.9 % — SIGNIFICANT CHANGE UP (ref 43–77)
NRBC # BLD: 0 /100 WBCS — SIGNIFICANT CHANGE UP
NRBC # FLD: 0 K/UL — SIGNIFICANT CHANGE UP
PLATELET # BLD AUTO: 206 K/UL — SIGNIFICANT CHANGE UP (ref 150–400)
POTASSIUM SERPL-MCNC: 4.2 MMOL/L — SIGNIFICANT CHANGE UP (ref 3.5–5.3)
POTASSIUM SERPL-SCNC: 4.2 MMOL/L — SIGNIFICANT CHANGE UP (ref 3.5–5.3)
PROT SERPL-MCNC: 6.8 G/DL — SIGNIFICANT CHANGE UP (ref 6–8.3)
PROTHROM AB SERPL-ACNC: 18.1 SEC — HIGH (ref 10.6–13.6)
RBC # BLD: 4.31 M/UL — SIGNIFICANT CHANGE UP (ref 4.2–5.8)
RBC # FLD: 13.3 % — SIGNIFICANT CHANGE UP (ref 10.3–14.5)
SARS-COV-2 RNA SPEC QL NAA+PROBE: SIGNIFICANT CHANGE UP
SODIUM SERPL-SCNC: 138 MMOL/L — SIGNIFICANT CHANGE UP (ref 135–145)
TROPONIN T, HIGH SENSITIVITY RESULT: 18 NG/L — SIGNIFICANT CHANGE UP
WBC # BLD: 5.39 K/UL — SIGNIFICANT CHANGE UP (ref 3.8–10.5)
WBC # FLD AUTO: 5.39 K/UL — SIGNIFICANT CHANGE UP (ref 3.8–10.5)

## 2021-06-09 PROCEDURE — 99223 1ST HOSP IP/OBS HIGH 75: CPT

## 2021-06-09 PROCEDURE — 71046 X-RAY EXAM CHEST 2 VIEWS: CPT | Mod: 26

## 2021-06-09 PROCEDURE — 99221 1ST HOSP IP/OBS SF/LOW 40: CPT

## 2021-06-09 PROCEDURE — 99285 EMERGENCY DEPT VISIT HI MDM: CPT

## 2021-06-09 RX ORDER — METOPROLOL TARTRATE 50 MG
1 TABLET ORAL
Qty: 0 | Refills: 0 | DISCHARGE

## 2021-06-09 RX ORDER — PHENOBARBITAL 60 MG
1 TABLET ORAL
Qty: 0 | Refills: 0 | DISCHARGE

## 2021-06-09 RX ORDER — TAMSULOSIN HYDROCHLORIDE 0.4 MG/1
1 CAPSULE ORAL
Qty: 0 | Refills: 0 | DISCHARGE

## 2021-06-09 RX ORDER — LEVOTHYROXINE SODIUM 125 MCG
0 TABLET ORAL
Qty: 0 | Refills: 0 | DISCHARGE

## 2021-06-09 RX ORDER — HEPARIN SODIUM 5000 [USP'U]/ML
INJECTION INTRAVENOUS; SUBCUTANEOUS
Qty: 25000 | Refills: 0 | Status: DISCONTINUED | OUTPATIENT
Start: 2021-06-09 | End: 2021-06-18

## 2021-06-09 RX ORDER — WARFARIN SODIUM 2.5 MG/1
5 TABLET ORAL ONCE
Refills: 0 | Status: COMPLETED | OUTPATIENT
Start: 2021-06-09 | End: 2021-06-09

## 2021-06-09 RX ORDER — PHENOBARBITAL 60 MG
0 TABLET ORAL
Qty: 0 | Refills: 0 | DISCHARGE

## 2021-06-09 RX ORDER — HEPARIN SODIUM 5000 [USP'U]/ML
3500 INJECTION INTRAVENOUS; SUBCUTANEOUS EVERY 6 HOURS
Refills: 0 | Status: DISCONTINUED | OUTPATIENT
Start: 2021-06-09 | End: 2021-06-18

## 2021-06-09 RX ORDER — LEVOTHYROXINE SODIUM 125 MCG
25 TABLET ORAL DAILY
Refills: 0 | Status: DISCONTINUED | OUTPATIENT
Start: 2021-06-09 | End: 2021-06-18

## 2021-06-09 RX ORDER — TAMSULOSIN HYDROCHLORIDE 0.4 MG/1
0.4 CAPSULE ORAL AT BEDTIME
Refills: 0 | Status: DISCONTINUED | OUTPATIENT
Start: 2021-06-09 | End: 2021-06-18

## 2021-06-09 RX ORDER — HEPARIN SODIUM 5000 [USP'U]/ML
7500 INJECTION INTRAVENOUS; SUBCUTANEOUS EVERY 6 HOURS
Refills: 0 | Status: DISCONTINUED | OUTPATIENT
Start: 2021-06-09 | End: 2021-06-18

## 2021-06-09 RX ORDER — SIMVASTATIN 20 MG/1
1 TABLET, FILM COATED ORAL
Qty: 0 | Refills: 0 | DISCHARGE

## 2021-06-09 RX ORDER — SIMVASTATIN 20 MG/1
0 TABLET, FILM COATED ORAL
Qty: 0 | Refills: 0 | DISCHARGE

## 2021-06-09 RX ORDER — METOPROLOL TARTRATE 50 MG
100 TABLET ORAL DAILY
Refills: 0 | Status: DISCONTINUED | OUTPATIENT
Start: 2021-06-09 | End: 2021-06-18

## 2021-06-09 RX ORDER — PHENOBARBITAL 60 MG
32.4 TABLET ORAL
Refills: 0 | Status: DISCONTINUED | OUTPATIENT
Start: 2021-06-09 | End: 2021-06-17

## 2021-06-09 RX ORDER — LEVOTHYROXINE SODIUM 125 MCG
1 TABLET ORAL
Qty: 0 | Refills: 0 | DISCHARGE

## 2021-06-09 RX ORDER — SIMVASTATIN 20 MG/1
20 TABLET, FILM COATED ORAL AT BEDTIME
Refills: 0 | Status: DISCONTINUED | OUTPATIENT
Start: 2021-06-09 | End: 2021-06-18

## 2021-06-09 RX ORDER — METOPROLOL TARTRATE 50 MG
0 TABLET ORAL
Qty: 0 | Refills: 0 | DISCHARGE

## 2021-06-09 RX ADMIN — HEPARIN SODIUM 1700 UNIT(S)/HR: 5000 INJECTION INTRAVENOUS; SUBCUTANEOUS at 21:16

## 2021-06-09 RX ADMIN — SIMVASTATIN 20 MILLIGRAM(S): 20 TABLET, FILM COATED ORAL at 21:06

## 2021-06-09 RX ADMIN — Medication 32.4 MILLIGRAM(S): at 21:16

## 2021-06-09 RX ADMIN — WARFARIN SODIUM 5 MILLIGRAM(S): 2.5 TABLET ORAL at 22:07

## 2021-06-09 RX ADMIN — TAMSULOSIN HYDROCHLORIDE 0.4 MILLIGRAM(S): 0.4 CAPSULE ORAL at 21:06

## 2021-06-09 RX ADMIN — Medication 200 MILLIGRAM(S): at 21:16

## 2021-06-09 NOTE — CONSULT NOTE ADULT - ASSESSMENT
Mr. 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     - Agree with therapeutic A/C.   - No acute Vascular Surgery intervention at this time, no evidence of phlegmasia present on physical exam.     C Team Vascular Surgery Pager #[phone]  Mr. 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     - Agree with therapeutic A/C.   - No acute Vascular Surgery intervention at this time, no evidence of phlegmasia present on physical exam.   pt states that he  is on lifelong  anticoag rx which is therapeutic   pt also states  previous hx of lle multiple dvt  will reach out to primary MD to discuss  recommend heme onc w/u for s/o hypercoag states and to r/o a malignancy paraneoplastic state  will follow     C Team Vascular Surgery Pager #[phone]

## 2021-06-09 NOTE — CONSULT NOTE ADULT - EXTREMITIES COMMENTS
mild venous insuff w mild st dermatitis   multiple v veins vipul calf and shins 1-2mm  mild lle edema

## 2021-06-09 NOTE — H&P ADULT - NSHPPHYSICALEXAM_GEN_ALL_CORE
T(C): 36.6 (06-09-21 @ 19:02), Max: 36.7 (06-09-21 @ 14:40)  HR: 66 (06-09-21 @ 19:02) (66 - 73)  BP: 121/68 (06-09-21 @ 19:02) (121/68 - 130/68)  RR: 18 (06-09-21 @ 19:02) (16 - 20)  SpO2: 98% (06-09-21 @ 19:02) (97% - 99%)  Wt(kg): --  GENERAL: NAD, well-developed  HEAD:  Atraumatic, Normocephalic  EYES: EOMI, PERRLA, conjunctiva and sclera clear  NECK: Supple, No JVD  CHEST/LUNG: Clear to auscultation bilaterally; No wheeze  HEART: Regular rate and rhythm; No murmurs, rubs, or gallops  ABDOMEN: Soft, Nontender, Nondistended; Bowel sounds present  EXTREMITIES:  2+ Peripheral Pulses, No clubbing, cyanosis, or edema  PSYCH: AAOx3  NEUROLOGY: non-focal  SKIN: No rashes or lesions

## 2021-06-09 NOTE — CONSULT NOTE ADULT - SUBJECTIVE AND OBJECTIVE BOX
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 Patient went to [facility] and doppler there demonstrated DVT in LLE with interval progression. Patient reported pain in LLE. Otherwise, no chest pain, sob, palpitation. Patient reported being complaint on all prescribed meds including coumadin. (09 Jun 2021 20:28)    Vascular Surgery consulted for additional evaluation of DVT. Reported findings of L mid femoral vein DVT. On evaluation, patient states he has noted swelling in the L calf. No leg pain, weakness, or tenderness to palpation. Walks with cane, has had no recent difficulty walking. No chest pain or shortness of breath. Currently takes coumadin, reports recent change in dose of medications.     PAST MEDICAL & SURGICAL HISTORY:  Hyperlipemia    Seizure Disorder    Dupuytren&#x27;s Contracture of Hand    History of Tonsillectomy    Benign Tumor of Short Bones of Lower Limb  removed    Hand(s) Dupuytrens Contracture  repair x 3 bilaterally    S/P Colonoscopic Polypectomy  ? year.        MEDICATIONS  (STANDING):  heparin  Infusion.  Unit(s)/Hr (17 mL/Hr) IV Continuous <Continuous>  levothyroxine 25 MICROGram(s) Oral daily  metoprolol tartrate 100 milliGRAM(s) Oral daily  PHENobarbital 32.4 milliGRAM(s) Oral two times a day  phenytoin   Capsule 200 milliGRAM(s) Oral at bedtime  simvastatin 20 milliGRAM(s) Oral at bedtime  tamsulosin 0.4 milliGRAM(s) Oral at bedtime      ___________________________________________  REVIEW OF SYSTEMS:  As stated in History of Present Illness, otherwise non-contributory.   ___________________________________________  PHYSICAL EXAM:  Vital Signs Last 24 Hrs  T(C): 36.6 (09 Jun 2021 21:57), Max: 36.7 (09 Jun 2021 14:40)  T(F): 97.9 (09 Jun 2021 21:57), Max: 98.1 (09 Jun 2021 14:40)  HR: 68 (09 Jun 2021 21:57) (66 - 73)  BP: 129/73 (09 Jun 2021 21:57) (121/68 - 137/64)  BP(mean): --  RR: 18 (09 Jun 2021 21:57) (16 - 20)  SpO2: 98% (09 Jun 2021 21:57) (97% - 99%)CAPILLARY BLOOD GLUCOSE        I&O's Detail      General: Alert and Oriented x3, No acute distress.  Respiratory: Breathing non-labored.  Abdomen: Soft, nontender, nondistended. No rebound, no guarding, No palpable organomegaly or masses.  Extremities: LLE calf swelling present. No lower extremity discoloration, strength and sensation to light touch intact bilaterally. Bilateral lower extremities cool symmetrically.   Vasc: Bilateral femoral, DP pulses present.   ____________________________________________  LABS:  CBC Full  -  ( 09 Jun 2021 17:47 )  WBC Count : 5.39 K/uL  RBC Count : 4.31 M/uL  Hemoglobin : 13.4 g/dL  Hematocrit : 40.2 %  Platelet Count - Automated : 206 K/uL  Mean Cell Volume : 93.3 fL  Mean Cell Hemoglobin : 31.1 pg  Mean Cell Hemoglobin Concentration : 33.3 gm/dL  Auto Neutrophil # : 3.28 K/uL  Auto Lymphocyte # : 1.42 K/uL  Auto Monocyte # : 0.53 K/uL  Auto Eosinophil # : 0.12 K/uL  Auto Basophil # : 0.02 K/uL  Auto Neutrophil % : 60.9 %  Auto Lymphocyte % : 26.3 %  Auto Monocyte % : 9.8 %  Auto Eosinophil % : 2.2 %  Auto Basophil % : 0.4 %    06-09    138  |  103  |  18  ----------------------------<  85  4.2   |  24  |  0.91    Ca    8.6      09 Jun 2021 17:47    TPro  6.8  /  Alb  4.2  /  TBili  0.3  /  DBili  x   /  AST  17  /  ALT  16  /  AlkPhos  65  06-09    LIVER FUNCTIONS - ( 09 Jun 2021 17:47 )  Alb: 4.2 g/dL / Pro: 6.8 g/dL / ALK PHOS: 65 U/L / ALT: 16 U/L / AST: 17 U/L / GGT: x           PT/INR - ( 09 Jun 2021 17:47 )   PT: 18.1 sec;   INR: 1.62 ratio         PTT - ( 09 Jun 2021 17:47 )  PTT:28.9 sec        ____________________________________________  RADIOLOGY:   [facility] Vascular Surgical Consultant Evaluation    HPI:  72yo M hx of MR s/p annuloplasty ring repair in 2011, 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 Patient went to [facility] and doppler there demonstrated DVT in LLE with interval progression. Patient reported pain in LLE. Otherwise, no chest pain, sob, palpitation. Patient reported being complaint on all prescribed meds including coumadin. (09 Jun 2021 20:28)    Vascular Surgery consulted for additional evaluation of DVT. Reported findings of L mid femoral vein DVT. On evaluation, patient states he has noted swelling in the L calf. No leg pain, weakness, or tenderness to palpation. Walks with cane, has had no recent difficulty walking. No chest pain or shortness of breath. Currently takes coumadin, reports recent change in dose of medications.     PAST MEDICAL & SURGICAL HISTORY:  Hyperlipemia    Seizure Disorder    Dupuytren&#x27;s Contracture of Hand    History of Tonsillectomy    Benign Tumor of Short Bones of Lower Limb  removed    Hand(s) Dupuytrens Contracture  repair x 3 bilaterally    S/P Colonoscopic Polypectomy  ? year.        MEDICATIONS  (STANDING):  heparin  Infusion.  Unit(s)/Hr (17 mL/Hr) IV Continuous <Continuous>  levothyroxine 25 MICROGram(s) Oral daily  metoprolol tartrate 100 milliGRAM(s) Oral daily  PHENobarbital 32.4 milliGRAM(s) Oral two times a day  phenytoin   Capsule 200 milliGRAM(s) Oral at bedtime  simvastatin 20 milliGRAM(s) Oral at bedtime  tamsulosin 0.4 milliGRAM(s) Oral at bedtime      ___________________________________________  REVIEW OF SYSTEMS:  As stated in History of Present Illness, otherwise non-contributory.   ___________________________________________  PHYSICAL EXAM:  Vital Signs Last 24 Hrs  T(C): 36.6 (09 Jun 2021 21:57), Max: 36.7 (09 Jun 2021 14:40)  T(F): 97.9 (09 Jun 2021 21:57), Max: 98.1 (09 Jun 2021 14:40)  HR: 68 (09 Jun 2021 21:57) (66 - 73)  BP: 129/73 (09 Jun 2021 21:57) (121/68 - 137/64)  BP(mean): --  RR: 18 (09 Jun 2021 21:57) (16 - 20)  SpO2: 98% (09 Jun 2021 21:57) (97% - 99%)CAPILLARY BLOOD GLUCOSE      I&O's Detail      General: Alert and Oriented x3, No acute distress.  Respiratory: Breathing non-labored.  Abdomen: Soft, nontender, nondistended. No rebound, no guarding, No palpable organomegaly or masses.  Extremities: LLE calf swelling present. No lower extremity discoloration, strength and sensation to light touch intact bilaterally. Bilateral lower extremities cool symmetrically.   Vasc: Bilateral femoral, DP pulses present.   ____________________________________________  LABS:  CBC Full  -  ( 09 Jun 2021 17:47 )  WBC Count : 5.39 K/uL  RBC Count : 4.31 M/uL  Hemoglobin : 13.4 g/dL  Hematocrit : 40.2 %  Platelet Count - Automated : 206 K/uL  Mean Cell Volume : 93.3 fL  Mean Cell Hemoglobin : 31.1 pg  Mean Cell Hemoglobin Concentration : 33.3 gm/dL  Auto Neutrophil # : 3.28 K/uL  Auto Lymphocyte # : 1.42 K/uL  Auto Monocyte # : 0.53 K/uL  Auto Eosinophil # : 0.12 K/uL  Auto Basophil # : 0.02 K/uL  Auto Neutrophil % : 60.9 %  Auto Lymphocyte % : 26.3 %  Auto Monocyte % : 9.8 %  Auto Eosinophil % : 2.2 %  Auto Basophil % : 0.4 %    06-09    138  |  103  |  18  ----------------------------<  85  4.2   |  24  |  0.91    Ca    8.6      09 Jun 2021 17:47    TPro  6.8  /  Alb  4.2  /  TBili  0.3  /  DBili  x   /  AST  17  /  ALT  16  /  AlkPhos  65  06-09    LIVER FUNCTIONS - ( 09 Jun 2021 17:47 )  Alb: 4.2 g/dL / Pro: 6.8 g/dL / ALK PHOS: 65 U/L / ALT: 16 U/L / AST: 17 U/L / GGT: x           PT/INR - ( 09 Jun 2021 17:47 )   PT: 18.1 sec;   INR: 1.62 ratio         PTT - ( 09 Jun 2021 17:47 )  PTT:28.9 sec

## 2021-06-09 NOTE — ED ADULT TRIAGE NOTE - CHIEF COMPLAINT QUOTE
pt sent by Antonia Godinez for +  DVT in left Mid femoral vein.  an U/S done today due to left calf swelling.   Pt denies SOB, no dizziness, no CP.

## 2021-06-09 NOTE — ED PROVIDER NOTE - NS ED ROS FT
Constitutional: no fevers, chills  HEENT: no cough, rhinorrhea  Cardiac: no chest pain, palpitations  Respiratory: no SOB  GI: no n/v, abd pain, bloody or dark stools  : no dysuria, frequency, or hematuria  MSK: +LLE pain  Skin: no rashes  Neuro: no headache, change in vision, weakness  Psych: negative

## 2021-06-09 NOTE — ED PROVIDER NOTE - OBJECTIVE STATEMENT
72yo M hx of "valve repair" on coumadin, HTN, epilepsy, dvt comes to ED for DVT. Pt having LLE swelling since mid-may. Denies chest pain or SOB. Was sent to [facility] radiology today and US showing LLE DVT w/ extension now into mid femoral v. Otherwise no active complaints. 72yo M hx of "valve repair" on coumadin, HTN, epilepsy, dvt comes to ED for DVT. Pt having LLE swelling since mid-may. Denies chest pain or SOB. Was sent to [facility] radiology today and US showing LLE DVT w/ extension now into mid femoral v. Otherwise no active complaints.   Attending - Agree with above.  I evaluated patient myself. 72 y/o M on coumadin for hx cardiac valve and DVT.  Reports LLE swelling x few weeks.  Outpatient ultrasound showed DVT and referred to ED.  Reports has been compliant with medication.  Denies trauma.  Denies CP/SOB.  No fever, cough, covid.

## 2021-06-09 NOTE — ED ADULT NURSE REASSESSMENT NOTE - NS ED NURSE REASSESS COMMENT FT1
Pt awake, alert and oriented x 4 with LLE swelling.   No pain at rest.   Denies chest pain and SOB.  Respirations even and unlabored.   IV site unremarkable.   Report given to night RN.

## 2021-06-09 NOTE — H&P ADULT - ASSESSMENT
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

## 2021-06-09 NOTE — ED ADULT NURSE NOTE - OBJECTIVE STATEMENT
Pt awake, alert and oriented x 4 presents with LLE pain and swelling - pt poor historian - unsure when pain and swelling started.   Pt ambulates with cane at baseline for last two months.   PMH seizure disorder.    Denies chest pain or SOB.    Respirations even and unlabored.   Denies fever, denies n/v/d.   Denies dizziness or headache.    Currently denies LLE pain.   IV placed and blood work sent.   VSS.    awaiting further plan of care.   no skin breakdown noted.

## 2021-06-09 NOTE — H&P ADULT - PROBLEM SELECTOR PLAN 1
- Patient subtherapeutic despite taking prescribed dose coumadin. This is expected as patient is concurrently taking dilantin and phenobarbital.   - Start heparin gtt.  - Dose coumadin tonight and trend INR. Will need to re-optimize coumadin dose at current AED dosage.  - No signs of PE at present. Will not pursue diagnosis of PE at this time as patient is VS stable and establishing a diagnosis of PE will not  at present.

## 2021-06-09 NOTE — H&P ADULT - HISTORY OF PRESENT ILLNESS
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 Patient went to [facility] and doppler there demonstrated DVT in LLE with interval progression. Patient reported pain in LLE. Otherwise, no chest pain, sob, palpitation. Patient reported being complaint on all prescribed meds including coumadin.

## 2021-06-09 NOTE — ED PROVIDER NOTE - CLINICAL SUMMARY MEDICAL DECISION MAKING FREE TEXT BOX
Pt w/ valvular replacement on coumadin here for DVT. VSS. Exam otherwise unremarkable aside from LLE. Concerning given DVT on AC. Will obtain labs including inr. Likely TBA.

## 2021-06-09 NOTE — H&P ADULT - NSHPLABSRESULTS_GEN_ALL_CORE
(06-09 @ 17:47)                      13.4  5.39 )-----------( 206                 40.2    Neutrophils = 3.28 (60.9%)  Lymphocytes = 1.42 (26.3%)  Eosinophils = 0.12 (2.2%)  Basophils = 0.02 (0.4%)  Monocytes = 0.53 (9.8%)  Bands = --%    06-09    138  |  103  |  18  ----------------------------<  85  4.2   |  24  |  0.91    Ca    8.6      09 Jun 2021 17:47    TPro  6.8  /  Alb  4.2  /  TBili  0.3  /  DBili  x   /  AST  17  /  ALT  16  /  AlkPhos  65  06-09    ( 09 Jun 2021 17:47 )   PT: 18.1 sec;   INR: 1.62 ratio;       PTT:28.9 sec      RVP:          Tox:

## 2021-06-09 NOTE — ED PROVIDER NOTE - PHYSICAL EXAMINATION
General: non-toxic, NAD  HEENT: NCAT, PERRL  Cardiac: RRR, no murmurs, 2+ peripheral pulses  Chest: CTA  Abdomen: soft, non-distended, bowel sounds present, no ttp, no rebound or guarding  Extremities: +LLE>RLE in size, minimal calf tenderness  Skin: no rashes  Neuro: AAOx4, 5+motor, sensory grossly intact  Psych: mood and affect appropriate General: non-toxic, NAD  HEENT: NCAT, PERRL  Cardiac: RRR, no murmurs, 2+ peripheral pulses  Chest: CTA  Abdomen: soft, non-distended, bowel sounds present, no ttp, no rebound or guarding  Extremities: +LLE>RLE in size, minimal calf tenderness  Skin: no rashes  Neuro: AAOx4, 5+motor, sensory grossly intact  Psych: mood and affect appropriate  ATTENDING PHYSICAL EXAM  GEN - NAD; well appearing; A+O x3  HEAD - NC/AT; EYES/NOSE - PERRL, EOMI, mucous membranes moist, no discharge; THROAT: Oral cavity and pharynx normal. No inflammation, swelling, exudate, or lesions  NECK: Neck supple, non-tender without lymphadenopathy, no masses, no JVD  PULMONARY - CTA b/l, symmetric breath sounds, no w/r/r  CARDIAC -s1s2, RRR, no M,R,G  ABDOMEN - +NABS, ND, NT, soft, no guarding, no rebound, no masses   BACK - no CVA tenderness, No vertebral or paravertebral tenderness  EXTREMITIES - symmetric pulses, +LLE with swelling/edema  NEUROLOGIC - alert, CN 2-12 intact

## 2021-06-09 NOTE — H&P ADULT - NSICDXPASTSURGICALHX_GEN_ALL_CORE_FT
PAST SURGICAL HISTORY:  Benign Tumor of Short Bones of Lower Limb removed    Hand(s) Dupuytrens Contracture repair x 3 bilaterally    History of Tonsillectomy     S/P Colonoscopic Polypectomy ? year.

## 2021-06-09 NOTE — H&P ADULT - PROBLEM SELECTOR PLAN 3
- S/p repair in 2011.  - No signs of acute decompensated heart failure at this time.  - Closely monitor I&Os.   - Close cardiology f/u.

## 2021-06-10 DIAGNOSIS — I82.412 ACUTE EMBOLISM AND THROMBOSIS OF LEFT FEMORAL VEIN: ICD-10-CM

## 2021-06-10 LAB
ALBUMIN SERPL ELPH-MCNC: 4 G/DL — SIGNIFICANT CHANGE UP (ref 3.3–5)
ALP SERPL-CCNC: 64 U/L — SIGNIFICANT CHANGE UP (ref 40–120)
ALT FLD-CCNC: 13 U/L — SIGNIFICANT CHANGE UP (ref 4–41)
ANION GAP SERPL CALC-SCNC: 12 MMOL/L — SIGNIFICANT CHANGE UP (ref 7–14)
APTT BLD: >200 SEC — SIGNIFICANT CHANGE UP (ref 27–36.3)
AST SERPL-CCNC: 14 U/L — SIGNIFICANT CHANGE UP (ref 4–40)
BILIRUB SERPL-MCNC: 0.5 MG/DL — SIGNIFICANT CHANGE UP (ref 0.2–1.2)
BUN SERPL-MCNC: 16 MG/DL — SIGNIFICANT CHANGE UP (ref 7–23)
CALCIUM SERPL-MCNC: 8.5 MG/DL — SIGNIFICANT CHANGE UP (ref 8.4–10.5)
CHLORIDE SERPL-SCNC: 102 MMOL/L — SIGNIFICANT CHANGE UP (ref 98–107)
CO2 SERPL-SCNC: 23 MMOL/L — SIGNIFICANT CHANGE UP (ref 22–31)
COVID-19 SPIKE DOMAIN AB INTERP: POSITIVE
COVID-19 SPIKE DOMAIN ANTIBODY RESULT: >250 U/ML — HIGH
CREAT SERPL-MCNC: 0.74 MG/DL — SIGNIFICANT CHANGE UP (ref 0.5–1.3)
GLUCOSE SERPL-MCNC: 82 MG/DL — SIGNIFICANT CHANGE UP (ref 70–99)
HCT VFR BLD CALC: 40.4 % — SIGNIFICANT CHANGE UP (ref 39–50)
HGB BLD-MCNC: 13.2 G/DL — SIGNIFICANT CHANGE UP (ref 13–17)
INR BLD: 1.64 RATIO — HIGH (ref 0.88–1.16)
MAGNESIUM SERPL-MCNC: 2.1 MG/DL — SIGNIFICANT CHANGE UP (ref 1.6–2.6)
MCHC RBC-ENTMCNC: 31.1 PG — SIGNIFICANT CHANGE UP (ref 27–34)
MCHC RBC-ENTMCNC: 32.7 GM/DL — SIGNIFICANT CHANGE UP (ref 32–36)
MCV RBC AUTO: 95.1 FL — SIGNIFICANT CHANGE UP (ref 80–100)
NRBC # BLD: 0 /100 WBCS — SIGNIFICANT CHANGE UP
NRBC # FLD: 0 K/UL — SIGNIFICANT CHANGE UP
PHENYTOIN FREE SERPL-MCNC: 8.7 UG/ML — LOW (ref 10–20)
PLATELET # BLD AUTO: 176 K/UL — SIGNIFICANT CHANGE UP (ref 150–400)
POTASSIUM SERPL-MCNC: 3.5 MMOL/L — SIGNIFICANT CHANGE UP (ref 3.5–5.3)
POTASSIUM SERPL-SCNC: 3.5 MMOL/L — SIGNIFICANT CHANGE UP (ref 3.5–5.3)
PROT SERPL-MCNC: 6.4 G/DL — SIGNIFICANT CHANGE UP (ref 6–8.3)
PROTHROM AB SERPL-ACNC: 18.3 SEC — HIGH (ref 10.6–13.6)
RBC # BLD: 4.25 M/UL — SIGNIFICANT CHANGE UP (ref 4.2–5.8)
RBC # FLD: 13.3 % — SIGNIFICANT CHANGE UP (ref 10.3–14.5)
SARS-COV-2 IGG+IGM SERPL QL IA: >250 U/ML — HIGH
SARS-COV-2 IGG+IGM SERPL QL IA: POSITIVE
SODIUM SERPL-SCNC: 137 MMOL/L — SIGNIFICANT CHANGE UP (ref 135–145)
WBC # BLD: 3.96 K/UL — SIGNIFICANT CHANGE UP (ref 3.8–10.5)
WBC # FLD AUTO: 3.96 K/UL — SIGNIFICANT CHANGE UP (ref 3.8–10.5)

## 2021-06-10 PROCEDURE — 99232 SBSQ HOSP IP/OBS MODERATE 35: CPT

## 2021-06-10 PROCEDURE — 99233 SBSQ HOSP IP/OBS HIGH 50: CPT

## 2021-06-10 RX ORDER — WARFARIN SODIUM 2.5 MG/1
5 TABLET ORAL ONCE
Refills: 0 | Status: COMPLETED | OUTPATIENT
Start: 2021-06-10 | End: 2021-06-10

## 2021-06-10 RX ADMIN — SIMVASTATIN 20 MILLIGRAM(S): 20 TABLET, FILM COATED ORAL at 21:36

## 2021-06-10 RX ADMIN — HEPARIN SODIUM 0 UNIT(S)/HR: 5000 INJECTION INTRAVENOUS; SUBCUTANEOUS at 04:11

## 2021-06-10 RX ADMIN — Medication 25 MICROGRAM(S): at 05:09

## 2021-06-10 RX ADMIN — WARFARIN SODIUM 5 MILLIGRAM(S): 2.5 TABLET ORAL at 18:29

## 2021-06-10 RX ADMIN — HEPARIN SODIUM 800 UNIT(S)/HR: 5000 INJECTION INTRAVENOUS; SUBCUTANEOUS at 20:44

## 2021-06-10 RX ADMIN — Medication 32.4 MILLIGRAM(S): at 17:07

## 2021-06-10 RX ADMIN — Medication 100 MILLIGRAM(S): at 05:09

## 2021-06-10 RX ADMIN — Medication 32.4 MILLIGRAM(S): at 05:09

## 2021-06-10 RX ADMIN — HEPARIN SODIUM 0 UNIT(S)/HR: 5000 INJECTION INTRAVENOUS; SUBCUTANEOUS at 19:41

## 2021-06-10 RX ADMIN — HEPARIN SODIUM 1400 UNIT(S)/HR: 5000 INJECTION INTRAVENOUS; SUBCUTANEOUS at 05:11

## 2021-06-10 RX ADMIN — Medication 200 MILLIGRAM(S): at 21:36

## 2021-06-10 RX ADMIN — HEPARIN SODIUM 0 UNIT(S)/HR: 5000 INJECTION INTRAVENOUS; SUBCUTANEOUS at 12:16

## 2021-06-10 RX ADMIN — HEPARIN SODIUM 1100 UNIT(S)/HR: 5000 INJECTION INTRAVENOUS; SUBCUTANEOUS at 13:22

## 2021-06-10 RX ADMIN — TAMSULOSIN HYDROCHLORIDE 0.4 MILLIGRAM(S): 0.4 CAPSULE ORAL at 21:36

## 2021-06-10 RX ADMIN — Medication 100 MILLIGRAM(S): at 11:44

## 2021-06-10 NOTE — PROGRESS NOTE ADULT - SUBJECTIVE AND OBJECTIVE BOX
VASCULAR SURGERY PROGRESS NOTE    INTERVAL EVENTS: No acute events, on heparin gtt. Denies chest pain, SOB      OBJECTIVE:    Vital Signs Last 24 Hrs  T(C): 36.6 (10 Bobby 2021 05:00), Max: 36.7 (09 Jun 2021 14:40)  T(F): 97.8 (10 Bobby 2021 05:00), Max: 98.1 (09 Jun 2021 14:40)  HR: 68 (10 Bobby 2021 05:00) (63 - 73)  BP: 130/77 (10 Bobby 2021 05:00) (121/68 - 137/64)  BP(mean): --  RR: 16 (10 Bobby 2021 05:00) (16 - 20)  SpO2: 100% (10 Bobby 2021 05:00) (97% - 100%)    General Appearance: Resting comfortably, no acute distress  Chest: non-labored breathing, no respiratory distress  CV: Pulse regular presently  Abdomen: Soft, non-tender, non-distended  Extremities: LLE mild calf swelling present. No lower extremity discoloration, strength and sensation to light touch intact bilaterally. Bilateral lower extremities cool symmetrically. Intact femoral and DP pulses    I&O's Summary    I&O's Detail        LABS:                        13.2   3.96  )-----------( 176      ( 10 Bobby 2021 03:29 )             40.4     06-10    137  |  102  |  16  ----------------------------<  82  3.5   |  23  |  0.74    Ca    8.5      10 Bobby 2021 03:29  Mg     2.1     06-10    TPro  6.4  /  Alb  4.0  /  TBili  0.5  /  DBili  x   /  AST  14  /  ALT  13  /  AlkPhos  64  06-10    PT/INR - ( 10 Bobby 2021 03:29 )   PT: 18.3 sec;   INR: 1.64 ratio         PTT - ( 10 Bobby 2021 03:29 )  PTT:>200 sec      RADIOLOGY & ADDITIONAL STUDIES: VASCULAR SURGERY PROGRESS NOTE    INTERVAL EVENTS: No acute events, on heparin gtt. Denies chest pain, SOB      OBJECTIVE: pt w/o new c/o     Vital Signs Last 24 Hrs  T(C): 36.6 (10 Bobby 2021 05:00), Max: 36.7 (09 Jun 2021 14:40)  T(F): 97.8 (10 Bobby 2021 05:00), Max: 98.1 (09 Jun 2021 14:40)  HR: 68 (10 Bobby 2021 05:00) (63 - 73)  BP: 130/77 (10 Bobby 2021 05:00) (121/68 - 137/64)  BP(mean): --  RR: 16 (10 Bobby 2021 05:00) (16 - 20)  SpO2: 100% (10 Bobby 2021 05:00) (97% - 100%)    General Appearance: Resting comfortably, no acute distress  Chest: non-labored breathing, no respiratory distress  CV: Pulse regular presently  Abdomen: Soft, non-tender, non-distended  Extremities: LLE mild calf swelling present. No lower extremity discoloration, strength and sensation to light touch intact bilaterally. Bilateral lower extremities cool symmetrically. Intact femoral and DP pulses    I&O's Summary    I&O's Detail        LABS:                        13.2   3.96  )-----------( 176      ( 10 Bobby 2021 03:29 )             40.4     06-10    137  |  102  |  16  ----------------------------<  82  3.5   |  23  |  0.74    Ca    8.5      10 Bobby 2021 03:29  Mg     2.1     06-10    TPro  6.4  /  Alb  4.0  /  TBili  0.5  /  DBili  x   /  AST  14  /  ALT  13  /  AlkPhos  64  06-10    PT/INR - ( 10 Bobby 2021 03:29 )   PT: 18.3 sec;   INR: 1.64 ratio         PTT - ( 10 Bobby 2021 03:29 )  PTT:>200 sec      RADIOLOGY & ADDITIONAL STUDIES:

## 2021-06-10 NOTE — PROGRESS NOTE ADULT - PROBLEM SELECTOR PLAN 1
- Patient subtherapeutic despite taking prescribed dose coumadin. This is expected as patient is concurrently taking dilantin and phenobarbital.   - Cont heparin gtt.  - Dose coumadin tonight and trend INR. Will need to re-optimize coumadin dose at current AED dosage.  - No signs of PE at present. Will not pursue diagnosis of PE at this time as patient is VS stable and establishing a diagnosis of PE will not  at present.  - vascular follg  - will get vascular cards consult, will wait for vascular cards recs, may need wkup

## 2021-06-10 NOTE — PROGRESS NOTE ADULT - ASSESSMENT
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     - Continue therapeutic AC  - Recommend hypercoagulable work up and  onc w/u for s/o  paraneoplastic condition  due to DVT  while on coumadin  - Bilateral lower extremity duplex re eval  above d/w pt  will follow    C Team Vascular Surgery Pager #[phone]

## 2021-06-10 NOTE — PHYSICAL THERAPY INITIAL EVALUATION ADULT - DISCHARGE DISPOSITION, PT EVAL
anticipated discharge to home with no skilled restorative physical therapy services upon discharge from [facility]. Please follow therapy for continued assessment.

## 2021-06-10 NOTE — PROGRESS NOTE ADULT - SUBJECTIVE AND OBJECTIVE BOX
Patient is a 71y old  Male who presents with a chief complaint of DVT (10 Bobby 2021 10:16)      SUBJECTIVE / OVERNIGHT EVENTS: Pt seen and examined at 10:40am,  no overnight events, pt denies any chest pain or sob, has some left leg discomfort, no other complaints, claims that he is complaint with coumadin as outpt. No other new issues reported.    MEDICATIONS  (STANDING):  heparin  Infusion.  Unit(s)/Hr (17 mL/Hr) IV Continuous <Continuous>  levothyroxine 25 MICROGram(s) Oral daily  metoprolol tartrate 100 milliGRAM(s) Oral daily  PHENobarbital 32.4 milliGRAM(s) Oral two times a day  phenytoin   Capsule 200 milliGRAM(s) Oral at bedtime  phenytoin   Capsule 100 milliGRAM(s) Oral daily  simvastatin 20 milliGRAM(s) Oral at bedtime  tamsulosin 0.4 milliGRAM(s) Oral at bedtime    MEDICATIONS  (PRN):  heparin   Injectable 7500 Unit(s) IV Push every 6 hours PRN For aPTT less than 40  heparin   Injectable 3500 Unit(s) IV Push every 6 hours PRN For aPTT between 40 - 57      Vital Signs Last 24 Hrs  T(C): 36.4 (10 Bobby 2021 12:54), Max: 36.7 (09 Jun 2021 14:40)  T(F): 97.5 (10 Bobby 2021 12:54), Max: 98.1 (09 Jun 2021 14:40)  HR: 63 (10 Bobby 2021 12:54) (63 - 73)  BP: 118/70 (10 Bobby 2021 12:54) (118/70 - 137/64)  BP(mean): --  RR: 16 (10 Bobby 2021 12:54) (16 - 20)  SpO2: 100% (10 Bobby 2021 12:54) (97% - 100%)  CAPILLARY BLOOD GLUCOSE        I&O's Summary    10 Bobby 2021 07:01  -  10 Bobby 2021 13:11  --------------------------------------------------------  IN: 250 mL / OUT: 0 mL / NET: 250 mL        PHYSICAL EXAM:  GENERAL: NAD, well-developed  CHEST/LUNG: Clear to auscultation bilaterally; No wheeze  HEART: Regular rate and rhythm  ABDOMEN: Soft, Nontender, Nondistended  EXTREMITIES: LLE calf tenderness and edema present,   PSYCH: Calm  NEUROLOGY: AAOx3  SKIN: No rashes or lesions    LABS:                        13.2   3.96  )-----------( 176      ( 10 Bobby 2021 03:29 )             40.4     06-10    137  |  102  |  16  ----------------------------<  82  3.5   |  23  |  0.74    Ca    8.5      10 Bobby 2021 03:29  Mg     2.1     06-10    TPro  6.4  /  Alb  4.0  /  TBili  0.5  /  DBili  x   /  AST  14  /  ALT  13  /  AlkPhos  64  06-10    PT/INR - ( 10 Bobby 2021 03:29 )   PT: 18.3 sec;   INR: 1.64 ratio         PTT - ( 10 Bobby 2021 11:37 )  PTT:>200 sec          RADIOLOGY & ADDITIONAL TESTS:    Imaging Personally Reviewed:    Consultant(s) Notes Reviewed:      Care Discussed with Consultants/Other Providers:

## 2021-06-10 NOTE — PHYSICAL THERAPY INITIAL EVALUATION ADULT - PERTINENT HX OF CURRENT PROBLEM, REHAB EVAL
Pt. 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

## 2021-06-10 NOTE — PROGRESS NOTE ADULT - PROBLEM SELECTOR PLAN 1
SHAR Oscar MD have seen and examined the patient today and agree with  the  evaluation, assessment and plan of the surgical house officer  SHAR Oscar MD have personally seen and examined the patient at bedside today at  11am

## 2021-06-10 NOTE — CHART NOTE - NSCHARTNOTEFT_GEN_A_CORE
Pt's dilantin level subtherapeutic. Pt follows with neurologist Dr. Trejo on [facility] in [place].   Discussed with JO Larson who consulted with Dr. Trejo: no need to change dilantin dosage as pt has been stable on this medication without seizures for many months. Thus will continue with current AED regimen.

## 2021-06-11 LAB
ANION GAP SERPL CALC-SCNC: 9 MMOL/L — SIGNIFICANT CHANGE UP (ref 7–14)
APTT BLD: 90.6 SEC — HIGH (ref 27–36.3)
APTT BLD: 96.2 SEC — HIGH (ref 27–36.3)
BUN SERPL-MCNC: 17 MG/DL — SIGNIFICANT CHANGE UP (ref 7–23)
CALCIUM SERPL-MCNC: 8.5 MG/DL — SIGNIFICANT CHANGE UP (ref 8.4–10.5)
CHLORIDE SERPL-SCNC: 101 MMOL/L — SIGNIFICANT CHANGE UP (ref 98–107)
CO2 SERPL-SCNC: 23 MMOL/L — SIGNIFICANT CHANGE UP (ref 22–31)
CREAT SERPL-MCNC: 0.78 MG/DL — SIGNIFICANT CHANGE UP (ref 0.5–1.3)
GLUCOSE SERPL-MCNC: 93 MG/DL — SIGNIFICANT CHANGE UP (ref 70–99)
HCT VFR BLD CALC: 40 % — SIGNIFICANT CHANGE UP (ref 39–50)
HCV AB S/CO SERPL IA: 0.1 S/CO — SIGNIFICANT CHANGE UP (ref 0–0.99)
HCV AB SERPL-IMP: SIGNIFICANT CHANGE UP
HGB BLD-MCNC: 13.3 G/DL — SIGNIFICANT CHANGE UP (ref 13–17)
MAGNESIUM SERPL-MCNC: 2.1 MG/DL — SIGNIFICANT CHANGE UP (ref 1.6–2.6)
MCHC RBC-ENTMCNC: 31.4 PG — SIGNIFICANT CHANGE UP (ref 27–34)
MCHC RBC-ENTMCNC: 33.3 GM/DL — SIGNIFICANT CHANGE UP (ref 32–36)
MCV RBC AUTO: 94.3 FL — SIGNIFICANT CHANGE UP (ref 80–100)
NRBC # BLD: 0 /100 WBCS — SIGNIFICANT CHANGE UP
NRBC # FLD: 0 K/UL — SIGNIFICANT CHANGE UP
PHENYTOIN FREE SERPL-MCNC: 8.2 UG/ML — LOW (ref 10–20)
PHOSPHATE SERPL-MCNC: 2.8 MG/DL — SIGNIFICANT CHANGE UP (ref 2.5–4.5)
PLATELET # BLD AUTO: 197 K/UL — SIGNIFICANT CHANGE UP (ref 150–400)
POTASSIUM SERPL-MCNC: 4 MMOL/L — SIGNIFICANT CHANGE UP (ref 3.5–5.3)
POTASSIUM SERPL-SCNC: 4 MMOL/L — SIGNIFICANT CHANGE UP (ref 3.5–5.3)
RBC # BLD: 4.24 M/UL — SIGNIFICANT CHANGE UP (ref 4.2–5.8)
RBC # FLD: 13.4 % — SIGNIFICANT CHANGE UP (ref 10.3–14.5)
SODIUM SERPL-SCNC: 133 MMOL/L — LOW (ref 135–145)
WBC # BLD: 4.87 K/UL — SIGNIFICANT CHANGE UP (ref 3.8–10.5)
WBC # FLD AUTO: 4.87 K/UL — SIGNIFICANT CHANGE UP (ref 3.8–10.5)

## 2021-06-11 PROCEDURE — 71260 CT THORAX DX C+: CPT | Mod: 26

## 2021-06-11 PROCEDURE — 99233 SBSQ HOSP IP/OBS HIGH 50: CPT

## 2021-06-11 PROCEDURE — 74177 CT ABD & PELVIS W/CONTRAST: CPT | Mod: 26

## 2021-06-11 PROCEDURE — 99232 SBSQ HOSP IP/OBS MODERATE 35: CPT

## 2021-06-11 PROCEDURE — 93970 EXTREMITY STUDY: CPT | Mod: 26

## 2021-06-11 RX ORDER — WARFARIN SODIUM 2.5 MG/1
2 TABLET ORAL ONCE
Refills: 0 | Status: COMPLETED | OUTPATIENT
Start: 2021-06-11 | End: 2021-06-11

## 2021-06-11 RX ADMIN — TAMSULOSIN HYDROCHLORIDE 0.4 MILLIGRAM(S): 0.4 CAPSULE ORAL at 21:31

## 2021-06-11 RX ADMIN — Medication 32.4 MILLIGRAM(S): at 17:23

## 2021-06-11 RX ADMIN — Medication 200 MILLIGRAM(S): at 21:31

## 2021-06-11 RX ADMIN — HEPARIN SODIUM 800 UNIT(S)/HR: 5000 INJECTION INTRAVENOUS; SUBCUTANEOUS at 09:48

## 2021-06-11 RX ADMIN — Medication 25 MICROGRAM(S): at 05:16

## 2021-06-11 RX ADMIN — Medication 100 MILLIGRAM(S): at 05:16

## 2021-06-11 RX ADMIN — HEPARIN SODIUM 800 UNIT(S)/HR: 5000 INJECTION INTRAVENOUS; SUBCUTANEOUS at 03:05

## 2021-06-11 RX ADMIN — WARFARIN SODIUM 2 MILLIGRAM(S): 2.5 TABLET ORAL at 17:48

## 2021-06-11 RX ADMIN — Medication 32.4 MILLIGRAM(S): at 05:16

## 2021-06-11 RX ADMIN — Medication 100 MILLIGRAM(S): at 12:37

## 2021-06-11 RX ADMIN — SIMVASTATIN 20 MILLIGRAM(S): 20 TABLET, FILM COATED ORAL at 21:31

## 2021-06-11 NOTE — PROGRESS NOTE ADULT - PROBLEM SELECTOR PLAN 3
- S/p repair in 2011.  - No signs of acute decompensated heart failure at this time.  - Closely monitor I&Os.   - Close cardiology f/u. - S/p repair in 2011.  - No signs of acute decompensated heart failure at this time.  - Closely monitor I&Os.   - Close cardiology f/u.  -echo pending

## 2021-06-11 NOTE — CONSULT NOTE ADULT - SUBJECTIVE AND OBJECTIVE BOX
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 Patient went to [facility] and doppler there demonstrated DVT in LLE with interval progression. Patient reported pain in LLE. Otherwise, no chest pain, sob, palpitation. Patient reported being complaint on all prescribed meds including coumadin.      Allergies    No Known Allergies    Intolerances    	    MEDICATIONS:  heparin   Injectable 7500 Unit(s) IV Push every 6 hours PRN  heparin   Injectable 3500 Unit(s) IV Push every 6 hours PRN  heparin  Infusion.  Unit(s)/Hr IV Continuous <Continuous>  metoprolol tartrate 100 milliGRAM(s) Oral daily  tamsulosin 0.4 milliGRAM(s) Oral at bedtime        PHENobarbital 32.4 milliGRAM(s) Oral two times a day  phenytoin   Capsule 200 milliGRAM(s) Oral at bedtime  phenytoin   Capsule 100 milliGRAM(s) Oral daily      levothyroxine 25 MICROGram(s) Oral daily  simvastatin 20 milliGRAM(s) Oral at bedtime        PAST MEDICAL & SURGICAL HISTORY:  Hyperlipemia    Seizure Disorder    Dupuytren&#x27;s Contracture of Hand    History of Tonsillectomy    Benign Tumor of Short Bones of Lower Limb  removed    Hand(s) Dupuytrens Contracture  repair x 3 bilaterally    S/P Colonoscopic Polypectomy  ? year.        FAMILY HISTORY:  No pertinent family history in first degree relatives        SOCIAL HISTORY:    non smoker. indep in adl      REVIEW OF SYSTEMS:  See HPI, otherwise complete 10 point review of systems negative    [ ] All others negative	      PHYSICAL EXAM:  T(C): 36.4 (06-11-21 @ 05:14), Max: 36.6 (06-10-21 @ 21:30)  HR: 68 (06-11-21 @ 05:14) (63 - 76)  BP: 133/66 (06-11-21 @ 05:14) (118/70 - 133/66)  RR: 18 (06-11-21 @ 05:14) (16 - 18)  SpO2: 96% (06-11-21 @ 05:14) (96% - 100%)  Wt(kg): --  I&O's Summary    10 Bobby 2021 07:01  -  11 Jun 2021 07:00  --------------------------------------------------------  IN: 410 mL / OUT: 0 mL / NET: 410 mL        Appearance: No Acute Distress	  HEENT:  Normal oral mucosa, PERRL, EOMI	  Cardiovascular: Normal S1 S2, No JVD, No murmurs/rubs/gallops  Respiratory: Lungs clear to auscultation bilaterally  Gastrointestinal:  Soft, Non-tender, + BS	  Skin: No rashes, No ecchymoses, No cyanosis	  Neurologic: Non-focal  Extremities: No clubbing, cyanosis or edema  Vascular: Peripheral pulses palpable 2+ bilaterally  Psychiatry: A & O x 3, Mood & affect appropriate    Laboratory Data:	 	    CBC Full  -  ( 11 Jun 2021 02:42 )  WBC Count : 4.87 K/uL  Hemoglobin : 13.3 g/dL  Hematocrit : 40.0 %  Platelet Count - Automated : 197 K/uL  Mean Cell Volume : 94.3 fL  Mean Cell Hemoglobin : 31.4 pg  Mean Cell Hemoglobin Concentration : 33.3 gm/dL  Auto Neutrophil # : x  Auto Lymphocyte # : x  Auto Monocyte # : x  Auto Eosinophil # : x  Auto Basophil # : x  Auto Neutrophil % : x  Auto Lymphocyte % : x  Auto Monocyte % : x  Auto Eosinophil % : x  Auto Basophil % : x    06-10    137  |  102  |  16  ----------------------------<  82  3.5   |  23  |  0.74  06-09    138  |  103  |  18  ----------------------------<  85  4.2   |  24  |  0.91    Ca    8.5      10 Bobby 2021 03:29  Ca    8.6      09 Jun 2021 17:47  Mg     2.1     06-10    TPro  6.4  /  Alb  4.0  /  TBili  0.5  /  DBili  x   /  AST  14  /  ALT  13  /  AlkPhos  64  06-10  TPro  6.8  /  Alb  4.2  /  TBili  0.3  /  DBili  x   /  AST  17  /  ALT  16  /  AlkPhos  65  06-09        ECG:  	not uploaded  RADIOLOGY:  OTHER: 	        Assessment:  MR s/p annuloplasty ring repair in 2011  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 would obtain thrombophilia panel, ct chest/abd/pelvis, fobt, psa  therapeutic ac  will follow        Greater than 60 minutes spent on total encounter; more than 50% of the visit was spent counseling and/or coordinating care by the attending physician.   	  Carlo Mason MD   Cardiovascular Diseases  [phone]

## 2021-06-11 NOTE — PROGRESS NOTE ADULT - PROBLEM SELECTOR PLAN 1
SHAR Oscar MD have seen and examined the patient today and agree with  the  evaluation, assessment and plan of the surgical house officer  SHAR Oscar MD have personally seen and examined the patient at bedside today at  1030am

## 2021-06-11 NOTE — PROGRESS NOTE ADULT - SUBJECTIVE AND OBJECTIVE BOX
Patient is a 71y old  Male who presents with a chief complaint of DVT (11 Jun 2021 13:09)      Vascular Surgery Attending Progress Note    Interval HPI: pt w/o new c/o     Medications:  heparin   Injectable 7500 Unit(s) IV Push every 6 hours PRN  heparin   Injectable 3500 Unit(s) IV Push every 6 hours PRN  heparin  Infusion.  Unit(s)/Hr IV Continuous <Continuous>  levothyroxine 25 MICROGram(s) Oral daily  metoprolol tartrate 100 milliGRAM(s) Oral daily  PHENobarbital 32.4 milliGRAM(s) Oral two times a day  phenytoin   Capsule 200 milliGRAM(s) Oral at bedtime  phenytoin   Capsule 100 milliGRAM(s) Oral daily  simvastatin 20 milliGRAM(s) Oral at bedtime  tamsulosin 0.4 milliGRAM(s) Oral at bedtime      Vital Signs Last 24 Hrs  T(C): 36.6 (11 Jun 2021 15:23), Max: 36.6 (10 Bobby 2021 21:30)  T(F): 97.9 (11 Jun 2021 15:23), Max: 97.9 (11 Jun 2021 15:23)  HR: 74 (11 Jun 2021 15:23) (68 - 76)  BP: 105/61 (11 Jun 2021 15:23) (105/61 - 133/66)  BP(mean): --  RR: 19 (11 Jun 2021 15:23) (17 - 19)  SpO2: 97% (11 Jun 2021 15:23) (96% - 98%)  I&O's Summary    10 Bobby 2021 07:01  -  11 Jun 2021 07:00  --------------------------------------------------------  IN: 410 mL / OUT: 0 mL / NET: 410 mL    11 Jun 2021 07:01  -  11 Jun 2021 16:55  --------------------------------------------------------  IN: 566 mL / OUT: 0 mL / NET: 566 mL        Physical Exam:  Neuro  A&Ox3 VSS  Vascular:  vipul le no sig edema     LABS:                        13.3   4.87  )-----------( 197      ( 11 Jun 2021 02:42 )             40.0     06-11    133<L>  |  101  |  17  ----------------------------<  93  4.0   |  23  |  0.78    Ca    8.5      11 Jun 2021 09:01  Phos  2.8     06-11  Mg     2.1     06-11    TPro  6.4  /  Alb  4.0  /  TBili  0.5  /  DBili  x   /  AST  14  /  ALT  13  /  AlkPhos  64  06-10    PT/INR - ( 11 Jun 2021 09:01 )   PT: 22.5 sec;   INR: 2.04 ratio         PTT - ( 11 Jun 2021 09:01 )  PTT:90.6 sec    ELEAZAR YIN MD  [phone] Cell [phone]

## 2021-06-11 NOTE — PROGRESS NOTE ADULT - ASSESSMENT
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

## 2021-06-11 NOTE — PROGRESS NOTE ADULT - ATTENDING COMMENTS
SHAR Oscar MD have seen and examined the patient today and agree with  the  evaluation, assessment and plan of the surgical house officer  SHAR Oscar MD have personally seen and examined the patient at bedside today at  1030am
SHAR Oscar MD have seen and examined the patient today and agree with  the  evaluation, assessment and plan of the surgical house officer  SHAR Oscar MD have personally seen and examined the patient at bedside today at  11am

## 2021-06-11 NOTE — PROGRESS NOTE ADULT - PROBLEM SELECTOR PLAN 1
- Patient subtherapeutic despite taking prescribed dose coumadin. This is expected as patient is concurrently taking dilantin and phenobarbital.   - Cont heparin gtt.  - Dose coumadin tonight and trend INR. Will need to re-optimize coumadin dose at current AED dosage.  - No signs of PE at present. Will not pursue diagnosis of PE at this time as patient is VS stable and establishing a diagnosis of PE will not  at present.  - vascular follg  - Seen by cardiology, will get hypercoagulable wkup and pan ct   - fobt  -f/u LE doppler - Patient subtherapeutic despite taking prescribed dose coumadin. This is expected as patient is concurrently taking dilantin and phenobarbital.   - Cont heparin gtt.  - Dose coumadin tonight and trend INR. Will need to re-optimize coumadin dose at current AED dosage.  - No signs of PE at present. Will not pursue diagnosis of PE at this time as patient is VS stable and establishing a diagnosis of PE will not  at present.  -vascular cards consulted  - vascular follg  - Seen by cardiology, will get hypercoagulable wkup and pan ct   - fobt  -f/u LE doppler

## 2021-06-11 NOTE — PROGRESS NOTE ADULT - SUBJECTIVE AND OBJECTIVE BOX
Patient is a 71y old  Male who presents with a chief complaint of DVT (11 Jun 2021 07:09)      SUBJECTIVE / OVERNIGHT EVENTS: Pt seen and examined at 10:40am,  no overnight events, pt denies any chest pain or sob, has some left leg discomfort, sitting in a chair, no other complaints. No other new issues reported.      MEDICATIONS  (STANDING):  heparin  Infusion.  Unit(s)/Hr (17 mL/Hr) IV Continuous <Continuous>  levothyroxine 25 MICROGram(s) Oral daily  metoprolol tartrate 100 milliGRAM(s) Oral daily  PHENobarbital 32.4 milliGRAM(s) Oral two times a day  phenytoin   Capsule 200 milliGRAM(s) Oral at bedtime  phenytoin   Capsule 100 milliGRAM(s) Oral daily  simvastatin 20 milliGRAM(s) Oral at bedtime  tamsulosin 0.4 milliGRAM(s) Oral at bedtime    MEDICATIONS  (PRN):  heparin   Injectable 7500 Unit(s) IV Push every 6 hours PRN For aPTT less than 40  heparin   Injectable 3500 Unit(s) IV Push every 6 hours PRN For aPTT between 40 - 57      Vital Signs Last 24 Hrs  T(C): 36.4 (11 Jun 2021 05:14), Max: 36.6 (10 Bobby 2021 21:30)  T(F): 97.6 (11 Jun 2021 05:14), Max: 97.8 (10 Bobby 2021 21:30)  HR: 68 (11 Jun 2021 05:14) (68 - 76)  BP: 133/66 (11 Jun 2021 05:14) (120/65 - 133/66)  BP(mean): --  RR: 18 (11 Jun 2021 05:14) (17 - 18)  SpO2: 96% (11 Jun 2021 05:14) (96% - 98%)  CAPILLARY BLOOD GLUCOSE        I&O's Summary    10 Bobby 2021 07:01  -  11 Jun 2021 07:00  --------------------------------------------------------  IN: 410 mL / OUT: 0 mL / NET: 410 mL    11 Jun 2021 07:01  -  11 Jun 2021 13:09  --------------------------------------------------------  IN: 218 mL / OUT: 0 mL / NET: 218 mL        PHYSICAL EXAM:  GENERAL: NAD, well-developed  CHEST/LUNG: Clear to auscultation bilaterally; No wheeze  HEART: Regular rate and rhythm  ABDOMEN: Soft, Nontender, Nondistended  EXTREMITIES: LLE mild calf tenderness and edema present,   PSYCH: Calm  NEUROLOGY: AAOx3  SKIN: No rashes or lesions    LABS:                        13.3   4.87  )-----------( 197      ( 11 Jun 2021 02:42 )             40.0     06-11    133<L>  |  101  |  17  ----------------------------<  93  4.0   |  23  |  0.78    Ca    8.5      11 Jun 2021 09:01  Phos  2.8     06-11  Mg     2.1     06-11    TPro  6.4  /  Alb  4.0  /  TBili  0.5  /  DBili  x   /  AST  14  /  ALT  13  /  AlkPhos  64  06-10    PT/INR - ( 11 Jun 2021 09:01 )   PT: 22.5 sec;   INR: 2.04 ratio         PTT - ( 11 Jun 2021 09:01 )  PTT:90.6 sec          RADIOLOGY & ADDITIONAL TESTS:    Imaging Personally Reviewed:    Consultant(s) Notes Reviewed:      Care Discussed with Consultants/Other Providers:

## 2021-06-11 NOTE — PROGRESS NOTE ADULT - ASSESSMENT
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     - Continue therapeutic AC  - Recommend hypercoagulable work up and  onc w/u for s/o  paraneoplastic condition  due to DVT  while on coumadin  - Bilateral lower extremity duplex re eval  c team will follow    C Team Vascular Surgery Pager #[phone]

## 2021-06-12 LAB
ANION GAP SERPL CALC-SCNC: 11 MMOL/L — SIGNIFICANT CHANGE UP (ref 7–14)
APTT BLD: 75 SEC — HIGH (ref 27–36.3)
AT III ACT/NOR PPP CHRO: 88 % — SIGNIFICANT CHANGE UP (ref 76–140)
BUN SERPL-MCNC: 20 MG/DL — SIGNIFICANT CHANGE UP (ref 7–23)
CALCIUM SERPL-MCNC: 9.2 MG/DL — SIGNIFICANT CHANGE UP (ref 8.4–10.5)
CHLORIDE SERPL-SCNC: 103 MMOL/L — SIGNIFICANT CHANGE UP (ref 98–107)
CO2 SERPL-SCNC: 23 MMOL/L — SIGNIFICANT CHANGE UP (ref 22–31)
CREAT SERPL-MCNC: 0.79 MG/DL — SIGNIFICANT CHANGE UP (ref 0.5–1.3)
GLUCOSE SERPL-MCNC: 94 MG/DL — SIGNIFICANT CHANGE UP (ref 70–99)
HCT VFR BLD CALC: 41.4 % — SIGNIFICANT CHANGE UP (ref 39–50)
HGB BLD-MCNC: 13.8 G/DL — SIGNIFICANT CHANGE UP (ref 13–17)
INR BLD: 1.6 RATIO — HIGH (ref 0.88–1.16)
LUPUS ANTICOAGULANT PROFILE RESULT: SIGNIFICANT CHANGE UP
MAGNESIUM SERPL-MCNC: 2.2 MG/DL — SIGNIFICANT CHANGE UP (ref 1.6–2.6)
MCHC RBC-ENTMCNC: 31.4 PG — SIGNIFICANT CHANGE UP (ref 27–34)
MCHC RBC-ENTMCNC: 33.3 GM/DL — SIGNIFICANT CHANGE UP (ref 32–36)
MCV RBC AUTO: 94.1 FL — SIGNIFICANT CHANGE UP (ref 80–100)
NRBC # BLD: 0 /100 WBCS — SIGNIFICANT CHANGE UP
NRBC # FLD: 0 K/UL — SIGNIFICANT CHANGE UP
OB PNL STL: NEGATIVE — SIGNIFICANT CHANGE UP
PHOSPHATE SERPL-MCNC: 2.9 MG/DL — SIGNIFICANT CHANGE UP (ref 2.5–4.5)
PLATELET # BLD AUTO: 209 K/UL — SIGNIFICANT CHANGE UP (ref 150–400)
POTASSIUM SERPL-MCNC: 3.9 MMOL/L — SIGNIFICANT CHANGE UP (ref 3.5–5.3)
POTASSIUM SERPL-SCNC: 3.9 MMOL/L — SIGNIFICANT CHANGE UP (ref 3.5–5.3)
PROTHROM AB SERPL-ACNC: 17.8 SEC — HIGH (ref 10.6–13.6)
PSA FLD-MCNC: 0.04 NG/ML — SIGNIFICANT CHANGE UP (ref 0–4)
RBC # BLD: 4.4 M/UL — SIGNIFICANT CHANGE UP (ref 4.2–5.8)
RBC # FLD: 13.4 % — SIGNIFICANT CHANGE UP (ref 10.3–14.5)
SODIUM SERPL-SCNC: 137 MMOL/L — SIGNIFICANT CHANGE UP (ref 135–145)
WBC # BLD: 4.48 K/UL — SIGNIFICANT CHANGE UP (ref 3.8–10.5)
WBC # FLD AUTO: 4.48 K/UL — SIGNIFICANT CHANGE UP (ref 3.8–10.5)

## 2021-06-12 PROCEDURE — 99233 SBSQ HOSP IP/OBS HIGH 50: CPT

## 2021-06-12 RX ORDER — WARFARIN SODIUM 2.5 MG/1
5 TABLET ORAL ONCE
Refills: 0 | Status: COMPLETED | OUTPATIENT
Start: 2021-06-12 | End: 2021-06-12

## 2021-06-12 RX ADMIN — TAMSULOSIN HYDROCHLORIDE 0.4 MILLIGRAM(S): 0.4 CAPSULE ORAL at 21:03

## 2021-06-12 RX ADMIN — Medication 100 MILLIGRAM(S): at 11:21

## 2021-06-12 RX ADMIN — WARFARIN SODIUM 5 MILLIGRAM(S): 2.5 TABLET ORAL at 18:37

## 2021-06-12 RX ADMIN — HEPARIN SODIUM 800 UNIT(S)/HR: 5000 INJECTION INTRAVENOUS; SUBCUTANEOUS at 11:20

## 2021-06-12 RX ADMIN — Medication 25 MICROGRAM(S): at 05:10

## 2021-06-12 RX ADMIN — Medication 32.4 MILLIGRAM(S): at 18:39

## 2021-06-12 RX ADMIN — Medication 200 MILLIGRAM(S): at 21:04

## 2021-06-12 RX ADMIN — Medication 100 MILLIGRAM(S): at 05:10

## 2021-06-12 RX ADMIN — SIMVASTATIN 20 MILLIGRAM(S): 20 TABLET, FILM COATED ORAL at 21:03

## 2021-06-12 RX ADMIN — Medication 32.4 MILLIGRAM(S): at 05:09

## 2021-06-12 NOTE — PROGRESS NOTE ADULT - ASSESSMENT
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     - No evidence of DVT on bilateral duplexes of LE as per in house duplex.  - Will try to obtain results from outpatient radiology center that showed DVT  - May stop the heparin and start the coumadin.   - No contraindication from vascular surgery stand point.    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     - No evidence of DVT on bilateral duplexes of LE as per in house duplex.  - Will try to obtain results from outpatient radiology center that showed DVT  - May stop the heparin and start the coumadin.   - No contraindication from vascular surgery stand point.  - Case was discussed with Dr. Dayne DE LA ROSA Team Vascular Surgery Pager #[phone]

## 2021-06-12 NOTE — PROGRESS NOTE ADULT - PROBLEM SELECTOR PLAN 1
- Patient subtherapeutic despite taking prescribed dose coumadin. This is expected as patient is concurrently taking dilantin and phenobarbital.   - Cont heparin gtt.  INR remains subtherapeutic at this time; cont to monitor INR daily  -vascular cards consulted  - vascular follg   hypercoagulable wkup and pan ct unremarkable  - fobt  LE doppler without DVT; conflicting info; would try to get reports from [facility]  regardless pt needs to be therapeutic on his coumadin

## 2021-06-12 NOTE — PROGRESS NOTE ADULT - SUBJECTIVE AND OBJECTIVE BOX
[facility] Division of Hospital Medicine  Tamar Aviles MD  Pager [phone]    Patient is a 71y old  Male who presents with a chief complaint of DVT       SUBJECTIVE / OVERNIGHT EVENTS: pt asking if he is going home; offers no complaints      MEDICATIONS  (STANDING):  heparin  Infusion.  Unit(s)/Hr (17 mL/Hr) IV Continuous <Continuous>  levothyroxine 25 MICROGram(s) Oral daily  metoprolol tartrate 100 milliGRAM(s) Oral daily  PHENobarbital 32.4 milliGRAM(s) Oral two times a day  phenytoin   Capsule 200 milliGRAM(s) Oral at bedtime  phenytoin   Capsule 100 milliGRAM(s) Oral daily  simvastatin 20 milliGRAM(s) Oral at bedtime  tamsulosin 0.4 milliGRAM(s) Oral at bedtime    MEDICATIONS  (PRN):  heparin   Injectable 7500 Unit(s) IV Push every 6 hours PRN For aPTT less than 40  heparin   Injectable 3500 Unit(s) IV Push every 6 hours PRN For aPTT between 40 - 57            PHYSICAL EXAM:  Vital Signs Last 24 Hrs  T(F): 98.8 (12 Jun 2021 14:20), Max: 98.8 (12 Jun 2021 14:20)  HR: 70 (12 Jun 2021 14:20) (64 - 73)  BP: 117/70 (12 Jun 2021 14:20) (117/70 - 120/58)  RR: 18 (12 Jun 2021 14:20) (16 - 18)  SpO2: 97% (12 Jun 2021 14:20) (96% - 97%)    CONSTITUTIONAL: NAD, sitting up in chair appears comfortable  EYES: PERRLA; conjunctiva and sclera clear  ENMT: Moist oral mucosa  RESPIRATORY: Normal respiratory effort; lungs are clear to auscultation bilaterally  CARDIOVASCULAR: Regular rate and rhythm; No lower extremity edema;   ABDOMEN: Nontender to palpation, normoactive bowel sounds  MUSCULOSKELETAL: no joint swelling or tenderness to palpation  PSYCH: Affect appropriate  NEUROLOGY: no gross sensory deficits   SKIN: hypertrophic crops of tan fleshy skin lesions, do not appear infected, chonic    LABS:                        13.8   4.48  )-----------( 209      ( 12 Jun 2021 06:51 )             41.4     06-12    137  |  103  |  20  ----------------------------<  94  3.9   |  23  |  0.79    Ca    9.2      12 Jun 2021 06:51  Phos  2.9     06-12  Mg     2.2     06-12      PT/INR - ( 12 Jun 2021 06:51 )   PT: 17.8 sec;   INR: 1.60 ratio         PTT - ( 12 Jun 2021 06:51 )  PTT:75.0 sec

## 2021-06-12 NOTE — PROGRESS NOTE ADULT - SUBJECTIVE AND OBJECTIVE BOX
VASCULAR SURGERY PROGRESS NOTE    INTERVAL EVENTS: Patient had LE duplexes showing no evidence of DVTs. Denies chest pain, sob      OBJECTIVE:    Vital Signs Last 24 Hrs  T(C): 36.6 (12 Jun 2021 05:07), Max: 36.6 (11 Jun 2021 15:23)  T(F): 97.9 (12 Jun 2021 05:07), Max: 97.9 (11 Jun 2021 15:23)  HR: 68 (12 Jun 2021 05:09) (64 - 74)  BP: 118/65 (12 Jun 2021 05:07) (105/61 - 120/58)  BP(mean): --  RR: 18 (12 Jun 2021 05:07) (16 - 19)  SpO2: 97% (12 Jun 2021 05:07) (96% - 97%)    General Appearance: Resting comfortably, no acute distress  Chest: non-labored breathing, no respiratory distress  CV: Pulse regular presently  Abdomen: Soft, non-tender, non-distended  Extremities: LLE mild calf swelling present. No lower extremity discoloration, strength and sensation to light touch intact bilaterally. Bilateral lower extremities cool symmetrically. Intact femoral and DP pulses      I&O's Summary    11 Jun 2021 07:01  -  12 Jun 2021 07:00  --------------------------------------------------------  IN: 1106 mL / OUT: 0 mL / NET: 1106 mL      I&O's Detail    11 Jun 2021 07:01  -  12 Jun 2021 07:00  --------------------------------------------------------  IN:    Heparin Infusion: 168 mL    Oral Fluid: 938 mL  Total IN: 1106 mL    OUT:  Total OUT: 0 mL    Total NET: 1106 mL            LABS:                        13.8   4.48  )-----------( 209      ( 12 Jun 2021 06:51 )             41.4     06-12    137  |  103  |  20  ----------------------------<  94  3.9   |  23  |  0.79    Ca    9.2      12 Jun 2021 06:51  Phos  2.9     06-12  Mg     2.2     06-12      PT/INR - ( 12 Jun 2021 06:51 )   PT: 17.8 sec;   INR: 1.60 ratio         PTT - ( 12 Jun 2021 06:51 )  PTT:75.0 sec      RADIOLOGY & ADDITIONAL STUDIES:

## 2021-06-12 NOTE — PROGRESS NOTE ADULT - PROBLEM SELECTOR PLAN 3
- S/p repair in 2011.  - No signs of acute decompensated heart failure at this time.  - Closely monitor I&Os.   - Close cardiology f/u.  -echo pending

## 2021-06-12 NOTE — PROGRESS NOTE ADULT - SUBJECTIVE AND OBJECTIVE BOX
Cardiovascular Disease Progress Note    Overnight events: No acute events overnight.  no new cardiac sx  Otherwise review of systems negative    Objective Findings:  T(C): 36.6 (06-12-21 @ 05:07), Max: 36.6 (06-11-21 @ 15:23)  HR: 68 (06-12-21 @ 05:09) (64 - 74)  BP: 118/65 (06-12-21 @ 05:07) (105/61 - 120/58)  RR: 18 (06-12-21 @ 05:07) (16 - 19)  SpO2: 97% (06-12-21 @ 05:07) (96% - 97%)  Wt(kg): --  Daily     Daily       Physical Exam:  Gen: NAD  HEENT: EOMI  CV: RRR, normal S1 + S2, no m/r/g  Lungs: CTAB  Abd: soft, non-tender  Ext: No edema    Telemetry:    Laboratory Data:                        13.8   4.48  )-----------( 209      ( 12 Jun 2021 06:51 )             41.4     06-12    137  |  103  |  20  ----------------------------<  94  3.9   |  23  |  0.79    Ca    9.2      12 Jun 2021 06:51  Phos  2.9     06-12  Mg     2.2     06-12      PT/INR - ( 12 Jun 2021 06:51 )   PT: 17.8 sec;   INR: 1.60 ratio         PTT - ( 12 Jun 2021 06:51 )  PTT:75.0 sec          Inpatient Medications:  MEDICATIONS  (STANDING):  heparin  Infusion.  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 would obtain thrombophilia panel, ct chest/abd/pelvis without acute findings, fobt neg, check psa  therapeutic ac with warfarin, goal inr 2-3  le david duplex neg for dvt, doubt failure of coumadin, c/w ac for now  dispo planning once INR > 2          Over 25 minutes spent on total encounter; more than 50% of the visit was spent counseling and/or coordinating care by the attending physician.      Carlo Mason MD   Cardiovascular Disease  [phone]

## 2021-06-13 LAB
ANION GAP SERPL CALC-SCNC: 12 MMOL/L — SIGNIFICANT CHANGE UP (ref 7–14)
APTT BLD: 67.4 SEC — HIGH (ref 27–36.3)
BUN SERPL-MCNC: 19 MG/DL — SIGNIFICANT CHANGE UP (ref 7–23)
CALCIUM SERPL-MCNC: 8.5 MG/DL — SIGNIFICANT CHANGE UP (ref 8.4–10.5)
CHLORIDE SERPL-SCNC: 104 MMOL/L — SIGNIFICANT CHANGE UP (ref 98–107)
CO2 SERPL-SCNC: 23 MMOL/L — SIGNIFICANT CHANGE UP (ref 22–31)
CREAT SERPL-MCNC: 0.83 MG/DL — SIGNIFICANT CHANGE UP (ref 0.5–1.3)
GLUCOSE SERPL-MCNC: 81 MG/DL — SIGNIFICANT CHANGE UP (ref 70–99)
HCT VFR BLD CALC: 40.2 % — SIGNIFICANT CHANGE UP (ref 39–50)
HGB BLD-MCNC: 13.4 G/DL — SIGNIFICANT CHANGE UP (ref 13–17)
INR BLD: 1.41 RATIO — HIGH (ref 0.88–1.16)
MAGNESIUM SERPL-MCNC: 2.1 MG/DL — SIGNIFICANT CHANGE UP (ref 1.6–2.6)
MCHC RBC-ENTMCNC: 31.3 PG — SIGNIFICANT CHANGE UP (ref 27–34)
MCHC RBC-ENTMCNC: 33.3 GM/DL — SIGNIFICANT CHANGE UP (ref 32–36)
MCV RBC AUTO: 93.9 FL — SIGNIFICANT CHANGE UP (ref 80–100)
NRBC # BLD: 0 /100 WBCS — SIGNIFICANT CHANGE UP
NRBC # FLD: 0 K/UL — SIGNIFICANT CHANGE UP
PHOSPHATE SERPL-MCNC: 2.7 MG/DL — SIGNIFICANT CHANGE UP (ref 2.5–4.5)
PLATELET # BLD AUTO: 200 K/UL — SIGNIFICANT CHANGE UP (ref 150–400)
POTASSIUM SERPL-MCNC: 4 MMOL/L — SIGNIFICANT CHANGE UP (ref 3.5–5.3)
POTASSIUM SERPL-SCNC: 4 MMOL/L — SIGNIFICANT CHANGE UP (ref 3.5–5.3)
PROTHROM AB SERPL-ACNC: 15.9 SEC — HIGH (ref 10.6–13.6)
RBC # BLD: 4.28 M/UL — SIGNIFICANT CHANGE UP (ref 4.2–5.8)
RBC # FLD: 13.4 % — SIGNIFICANT CHANGE UP (ref 10.3–14.5)
SODIUM SERPL-SCNC: 139 MMOL/L — SIGNIFICANT CHANGE UP (ref 135–145)
WBC # BLD: 4.25 K/UL — SIGNIFICANT CHANGE UP (ref 3.8–10.5)
WBC # FLD AUTO: 4.25 K/UL — SIGNIFICANT CHANGE UP (ref 3.8–10.5)

## 2021-06-13 PROCEDURE — 99233 SBSQ HOSP IP/OBS HIGH 50: CPT

## 2021-06-13 RX ORDER — WARFARIN SODIUM 2.5 MG/1
5 TABLET ORAL ONCE
Refills: 0 | Status: COMPLETED | OUTPATIENT
Start: 2021-06-13 | End: 2021-06-13

## 2021-06-13 RX ADMIN — HEPARIN SODIUM 800 UNIT(S)/HR: 5000 INJECTION INTRAVENOUS; SUBCUTANEOUS at 09:45

## 2021-06-13 RX ADMIN — Medication 100 MILLIGRAM(S): at 11:14

## 2021-06-13 RX ADMIN — TAMSULOSIN HYDROCHLORIDE 0.4 MILLIGRAM(S): 0.4 CAPSULE ORAL at 21:20

## 2021-06-13 RX ADMIN — Medication 25 MICROGRAM(S): at 06:07

## 2021-06-13 RX ADMIN — SIMVASTATIN 20 MILLIGRAM(S): 20 TABLET, FILM COATED ORAL at 21:20

## 2021-06-13 RX ADMIN — Medication 32.4 MILLIGRAM(S): at 06:06

## 2021-06-13 RX ADMIN — Medication 32.4 MILLIGRAM(S): at 18:27

## 2021-06-13 RX ADMIN — WARFARIN SODIUM 5 MILLIGRAM(S): 2.5 TABLET ORAL at 18:31

## 2021-06-13 RX ADMIN — Medication 100 MILLIGRAM(S): at 06:07

## 2021-06-13 RX ADMIN — Medication 200 MILLIGRAM(S): at 21:20

## 2021-06-13 NOTE — PROGRESS NOTE ADULT - PROBLEM SELECTOR PLAN 1
- Patient subtherapeutic despite taking prescribed dose coumadin. This is expected as patient is concurrently taking dilantin and phenobarbital.   - Cont heparin gtt.  INR remains subtherapeutic at this time; cont to monitor INR daily  -vascular cards consulted  - vascular follg   hypercoagulable wkup and pan ct unremarkable  - fobt  LE doppler without DVT; conflicting info; pt has report and disk from outside study; this should be reviewed by radiology  regardless pt needs to be therapeutic on his coumadin

## 2021-06-13 NOTE — PROGRESS NOTE ADULT - SUBJECTIVE AND OBJECTIVE BOX
[facility] Division of Hospital Medicine  Tamar Aviles MD  Pager [phone]    Patient is a 71y old  Male who presents with a chief complaint of DVT     SUBJECTIVE / OVERNIGHT EVENTS: pt without complaints; asked if he is going home; explained that INR is not therapeutic so he needs to stay; also d/w pt that doppler neg for DVT; he has disk and reports from outpt that revealed DVT...this should be reviewed and compared with radiology one done here      MEDICATIONS  (STANDING):  heparin  Infusion.  Unit(s)/Hr (17 mL/Hr) IV Continuous <Continuous>  levothyroxine 25 MICROGram(s) Oral daily  metoprolol tartrate 100 milliGRAM(s) Oral daily  PHENobarbital 32.4 milliGRAM(s) Oral two times a day  phenytoin   Capsule 200 milliGRAM(s) Oral at bedtime  phenytoin   Capsule 100 milliGRAM(s) Oral daily  simvastatin 20 milliGRAM(s) Oral at bedtime  tamsulosin 0.4 milliGRAM(s) Oral at bedtime  warfarin 5 milliGRAM(s) Oral once    MEDICATIONS  (PRN):  heparin   Injectable 7500 Unit(s) IV Push every 6 hours PRN For aPTT less than 40  heparin   Injectable 3500 Unit(s) IV Push every 6 hours PRN For aPTT between 40 - 57      PHYSICAL EXAM:  Vital Signs Last 24 Hrs  T(F): 97.4 (13 Jun 2021 06:16), Max: 98.8 (12 Jun 2021 14:20)  HR: 67 (13 Jun 2021 06:16) (67 - 70)  BP: 136/73 (13 Jun 2021 06:16) (117/70 - 136/73)  RR: 17 (13 Jun 2021 06:16) (17 - 18)  SpO2: 100% (13 Jun 2021 06:16) (97% - 100%)    CONSTITUTIONAL: NAD, sitting in chair appears comfortable  EYES: PERRLA; conjunctiva and sclera clear  ENMT: Moist oral mucosa  RESPIRATORY: Normal respiratory effort; lungs are clear to auscultation bilaterally  CARDIOVASCULAR: Regular rate and rhythm No lower extremity edema;  ABDOMEN: Nontender to palpation, normoactive bowel sounds  MUSCULOSKELETAL:  no joint swelling or tenderness to palpation  PSYCH: affect appropriate; hyperverbal at times  NEUROLOGY: no gross sensory deficits       LABS:                        13.4   4.25  )-----------( 200      ( 13 Jun 2021 07:27 )             40.2     06-13    139  |  104  |  19  ----------------------------<  81  4.0   |  23  |  0.83    Ca    8.5      13 Jun 2021 07:27  Phos  2.7     06-13  Mg     2.1     06-13      PT/INR - ( 13 Jun 2021 08:52 )   PT: 15.9 sec;   INR: 1.41 ratio         PTT - ( 13 Jun 2021 08:52 )  PTT:67.4 sec

## 2021-06-14 LAB
ANION GAP SERPL CALC-SCNC: 11 MMOL/L — SIGNIFICANT CHANGE UP (ref 7–14)
APTT BLD: 60.6 SEC — HIGH (ref 27–36.3)
BASOPHILS # BLD AUTO: 0.02 K/UL — SIGNIFICANT CHANGE UP (ref 0–0.2)
BASOPHILS NFR BLD AUTO: 0.4 % — SIGNIFICANT CHANGE UP (ref 0–2)
BUN SERPL-MCNC: 17 MG/DL — SIGNIFICANT CHANGE UP (ref 7–23)
CALCIUM SERPL-MCNC: 8.6 MG/DL — SIGNIFICANT CHANGE UP (ref 8.4–10.5)
CHLORIDE SERPL-SCNC: 104 MMOL/L — SIGNIFICANT CHANGE UP (ref 98–107)
CO2 SERPL-SCNC: 24 MMOL/L — SIGNIFICANT CHANGE UP (ref 22–31)
CREAT SERPL-MCNC: 0.77 MG/DL — SIGNIFICANT CHANGE UP (ref 0.5–1.3)
EOSINOPHIL # BLD AUTO: 0.18 K/UL — SIGNIFICANT CHANGE UP (ref 0–0.5)
EOSINOPHIL NFR BLD AUTO: 3.5 % — SIGNIFICANT CHANGE UP (ref 0–6)
GLUCOSE SERPL-MCNC: 86 MG/DL — SIGNIFICANT CHANGE UP (ref 70–99)
HCT VFR BLD CALC: 41 % — SIGNIFICANT CHANGE UP (ref 39–50)
HGB BLD-MCNC: 13.8 G/DL — SIGNIFICANT CHANGE UP (ref 13–17)
IANC: 2.93 K/UL — SIGNIFICANT CHANGE UP (ref 1.5–8.5)
IMM GRANULOCYTES NFR BLD AUTO: 0.2 % — SIGNIFICANT CHANGE UP (ref 0–1.5)
INR BLD: 1.25 RATIO — HIGH (ref 0.88–1.16)
LYMPHOCYTES # BLD AUTO: 1.51 K/UL — SIGNIFICANT CHANGE UP (ref 1–3.3)
LYMPHOCYTES # BLD AUTO: 29.5 % — SIGNIFICANT CHANGE UP (ref 13–44)
MAGNESIUM SERPL-MCNC: 2.1 MG/DL — SIGNIFICANT CHANGE UP (ref 1.6–2.6)
MCHC RBC-ENTMCNC: 31.7 PG — SIGNIFICANT CHANGE UP (ref 27–34)
MCHC RBC-ENTMCNC: 33.7 GM/DL — SIGNIFICANT CHANGE UP (ref 32–36)
MCV RBC AUTO: 94.3 FL — SIGNIFICANT CHANGE UP (ref 80–100)
MONOCYTES # BLD AUTO: 0.46 K/UL — SIGNIFICANT CHANGE UP (ref 0–0.9)
MONOCYTES NFR BLD AUTO: 9 % — SIGNIFICANT CHANGE UP (ref 2–14)
NEUTROPHILS # BLD AUTO: 2.93 K/UL — SIGNIFICANT CHANGE UP (ref 1.8–7.4)
NEUTROPHILS NFR BLD AUTO: 57.4 % — SIGNIFICANT CHANGE UP (ref 43–77)
NRBC # BLD: 0 /100 WBCS — SIGNIFICANT CHANGE UP
NRBC # FLD: 0 K/UL — SIGNIFICANT CHANGE UP
PHOSPHATE SERPL-MCNC: 3.6 MG/DL — SIGNIFICANT CHANGE UP (ref 2.5–4.5)
PLATELET # BLD AUTO: 188 K/UL — SIGNIFICANT CHANGE UP (ref 150–400)
POTASSIUM SERPL-MCNC: 4.3 MMOL/L — SIGNIFICANT CHANGE UP (ref 3.5–5.3)
POTASSIUM SERPL-SCNC: 4.3 MMOL/L — SIGNIFICANT CHANGE UP (ref 3.5–5.3)
PROT C ACT/NOR PPP: 61 % — LOW (ref 74–150)
PROT S FREE AG PPP IA-ACNC: 42 % — LOW (ref 67–141)
PROTHROM AB SERPL-ACNC: 14.1 SEC — HIGH (ref 10.6–13.6)
RBC # BLD: 4.35 M/UL — SIGNIFICANT CHANGE UP (ref 4.2–5.8)
RBC # FLD: 13.4 % — SIGNIFICANT CHANGE UP (ref 10.3–14.5)
SODIUM SERPL-SCNC: 139 MMOL/L — SIGNIFICANT CHANGE UP (ref 135–145)
WBC # BLD: 5.11 K/UL — SIGNIFICANT CHANGE UP (ref 3.8–10.5)
WBC # FLD AUTO: 5.11 K/UL — SIGNIFICANT CHANGE UP (ref 3.8–10.5)

## 2021-06-14 PROCEDURE — 99233 SBSQ HOSP IP/OBS HIGH 50: CPT

## 2021-06-14 RX ORDER — WARFARIN SODIUM 2.5 MG/1
7.5 TABLET ORAL ONCE
Refills: 0 | Status: COMPLETED | OUTPATIENT
Start: 2021-06-14 | End: 2021-06-14

## 2021-06-14 RX ADMIN — Medication 100 MILLIGRAM(S): at 06:16

## 2021-06-14 RX ADMIN — Medication 200 MILLIGRAM(S): at 21:31

## 2021-06-14 RX ADMIN — Medication 25 MICROGRAM(S): at 06:16

## 2021-06-14 RX ADMIN — WARFARIN SODIUM 7.5 MILLIGRAM(S): 2.5 TABLET ORAL at 17:56

## 2021-06-14 RX ADMIN — TAMSULOSIN HYDROCHLORIDE 0.4 MILLIGRAM(S): 0.4 CAPSULE ORAL at 21:31

## 2021-06-14 RX ADMIN — SIMVASTATIN 20 MILLIGRAM(S): 20 TABLET, FILM COATED ORAL at 21:31

## 2021-06-14 RX ADMIN — Medication 32.4 MILLIGRAM(S): at 06:16

## 2021-06-14 RX ADMIN — Medication 32.4 MILLIGRAM(S): at 17:56

## 2021-06-14 RX ADMIN — Medication 100 MILLIGRAM(S): at 12:25

## 2021-06-14 RX ADMIN — HEPARIN SODIUM 800 UNIT(S)/HR: 5000 INJECTION INTRAVENOUS; SUBCUTANEOUS at 07:49

## 2021-06-14 NOTE — PROGRESS NOTE ADULT - PROBLEM SELECTOR PLAN 5
- on Hep gtt for full AC bridging to coumadin  - Precautions ordered.  - PT. - on Hep gtt for full AC bridging to coumadin  - Precautions ordered.  - Plan discussed with ACP

## 2021-06-14 NOTE — CHART NOTE - NSCHARTNOTEFT_GEN_A_CORE
Out patient LE duplex  from 6/9 disk obtained and uploaded into PACS.   Vascular surgery (#[id number]) made aware.

## 2021-06-14 NOTE — PROGRESS NOTE ADULT - PROBLEM SELECTOR PLAN 3
- S/p repair in 2011.  - No signs of acute decompensated heart failure at this time.  - Closely monitor I&Os.   - Close cardiology f/u.  -echo pending - S/p repair in 2011.  - No signs of acute decompensated heart failure at this time.  - Closely monitor I&Os.   - Close cardiology f/u.  -echo pending- f/u

## 2021-06-14 NOTE — PROGRESS NOTE ADULT - PROBLEM SELECTOR PLAN 1
- Patient subtherapeutic despite taking prescribed dose coumadin. This is expected as patient is concurrently taking dilantin and phenobarbital.   - Cont heparin gtt.  INR remains subtherapeutic at this time; cont to monitor INR daily  - vascular follg  -f/u hypercoagulable wkup   pan ct unremarkable  - fobt neg  LE doppler without DVT; conflicting info; pt has report and disk from outside study; this should be reviewed by radiology  regardless pt needs to be therapeutic on his coumadin

## 2021-06-14 NOTE — PROGRESS NOTE ADULT - SUBJECTIVE AND OBJECTIVE BOX
Cardiovascular Disease Progress Note    Overnight events: No acute events overnight.  no cp/sob/palps  Otherwise review of systems negative    Objective Findings:  T(C): 36.3 (06-14-21 @ 06:15), Max: 36.8 (06-13-21 @ 14:37)  HR: 70 (06-14-21 @ 06:15) (70 - 72)  BP: 130/58 (06-14-21 @ 06:15) (111/67 - 130/58)  RR: 16 (06-14-21 @ 06:15) (16 - 18)  SpO2: 97% (06-14-21 @ 06:15) (97% - 100%)  Wt(kg): --  Daily     Daily       Physical Exam:  Gen: NAD  HEENT: EOMI  CV: RRR, normal S1 + S2, no m/r/g  Lungs: CTAB  Abd: soft, non-tender  Ext: No edema    Telemetry:    Laboratory Data:                        13.8   5.11  )-----------( 188      ( 14 Jun 2021 06:43 )             41.0     06-13    139  |  104  |  19  ----------------------------<  81  4.0   |  23  |  0.83    Ca    8.5      13 Jun 2021 07:27  Phos  2.7     06-13  Mg     2.1     06-13      PT/INR - ( 14 Jun 2021 06:43 )   PT: 14.1 sec;   INR: 1.25 ratio         PTT - ( 14 Jun 2021 06:43 )  PTT:60.6 sec          Inpatient Medications:  MEDICATIONS  (STANDING):  heparin  Infusion.  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 would obtain thrombophilia panel, ct chest/abd/pelvis without acute findings, fobt neg, check psa  therapeutic ac with warfarin, goal inr 2-3. would dc home on lovenox bridge to coumadin  le david duplex neg for dvt, doubt failure of coumadin, c/w ac for now          Over 25 minutes spent on total encounter; more than 50% of the visit was spent counseling and/or coordinating care by the attending physician.      Carlo Mason MD   Cardiovascular Disease  [phone]

## 2021-06-14 NOTE — PROGRESS NOTE ADULT - SUBJECTIVE AND OBJECTIVE BOX
Patient is a 71y old  Male who presents with a chief complaint of DVT (14 Jun 2021 07:38)      SUBJECTIVE / OVERNIGHT EVENTS: Pt seen and examined at 12:45pm, no overnight events, pt denies any bleeding, sob, chest pain or any other complaints. No other new issues reported.    MEDICATIONS  (STANDING):  heparin  Infusion.  Unit(s)/Hr (17 mL/Hr) IV Continuous <Continuous>  levothyroxine 25 MICROGram(s) Oral daily  metoprolol tartrate 100 milliGRAM(s) Oral daily  PHENobarbital 32.4 milliGRAM(s) Oral two times a day  phenytoin   Capsule 200 milliGRAM(s) Oral at bedtime  phenytoin   Capsule 100 milliGRAM(s) Oral daily  simvastatin 20 milliGRAM(s) Oral at bedtime  tamsulosin 0.4 milliGRAM(s) Oral at bedtime    MEDICATIONS  (PRN):  heparin   Injectable 7500 Unit(s) IV Push every 6 hours PRN For aPTT less than 40  heparin   Injectable 3500 Unit(s) IV Push every 6 hours PRN For aPTT between 40 - 57      Vital Signs Last 24 Hrs  T(C): 36.3 (14 Jun 2021 06:15), Max: 36.8 (13 Jun 2021 14:37)  T(F): 97.4 (14 Jun 2021 06:15), Max: 98.2 (13 Jun 2021 14:37)  HR: 70 (14 Jun 2021 06:15) (70 - 72)  BP: 130/58 (14 Jun 2021 06:15) (111/67 - 130/58)  BP(mean): --  RR: 16 (14 Jun 2021 06:15) (16 - 18)  SpO2: 97% (14 Jun 2021 06:15) (97% - 100%)  CAPILLARY BLOOD GLUCOSE        I&O's Summary      PHYSICAL EXAM:  GENERAL: NAD, well-developed  CHEST/LUNG: Clear to auscultation bilaterally; No wheeze  HEART: Regular rate and rhythm  ABDOMEN: Soft, Nontender, Nondistended  EXTREMITIES:  no LE edema  PSYCH: Calm  NEUROLOGY: AAOx3  SKIN: No rashes or lesions    LABS:                        13.8   5.11  )-----------( 188      ( 14 Jun 2021 06:43 )             41.0     06-14    139  |  104  |  17  ----------------------------<  86  4.3   |  24  |  0.77    Ca    8.6      14 Jun 2021 06:43  Phos  3.6     06-14  Mg     2.1     06-14      PT/INR - ( 14 Jun 2021 06:43 )   PT: 14.1 sec;   INR: 1.25 ratio         PTT - ( 14 Jun 2021 06:43 )  PTT:60.6 sec          RADIOLOGY & ADDITIONAL TESTS:    Imaging Personally Reviewed:    Consultant(s) Notes Reviewed:      Care Discussed with Consultants/Other Providers:

## 2021-06-15 LAB
ANION GAP SERPL CALC-SCNC: 9 MMOL/L — SIGNIFICANT CHANGE UP (ref 7–14)
APTT BLD: 61.4 SEC — HIGH (ref 27–36.3)
BUN SERPL-MCNC: 17 MG/DL — SIGNIFICANT CHANGE UP (ref 7–23)
CALCIUM SERPL-MCNC: 9 MG/DL — SIGNIFICANT CHANGE UP (ref 8.4–10.5)
CARDIOLIPIN AB SER-ACNC: NEGATIVE — SIGNIFICANT CHANGE UP
CHLORIDE SERPL-SCNC: 104 MMOL/L — SIGNIFICANT CHANGE UP (ref 98–107)
CO2 SERPL-SCNC: 25 MMOL/L — SIGNIFICANT CHANGE UP (ref 22–31)
CREAT SERPL-MCNC: 0.86 MG/DL — SIGNIFICANT CHANGE UP (ref 0.5–1.3)
GLUCOSE SERPL-MCNC: 83 MG/DL — SIGNIFICANT CHANGE UP (ref 70–99)
HCT VFR BLD CALC: 39.9 % — SIGNIFICANT CHANGE UP (ref 39–50)
HGB BLD-MCNC: 13.1 G/DL — SIGNIFICANT CHANGE UP (ref 13–17)
INR BLD: 1.27 RATIO — HIGH (ref 0.88–1.16)
MCHC RBC-ENTMCNC: 31.3 PG — SIGNIFICANT CHANGE UP (ref 27–34)
MCHC RBC-ENTMCNC: 32.8 GM/DL — SIGNIFICANT CHANGE UP (ref 32–36)
MCV RBC AUTO: 95.2 FL — SIGNIFICANT CHANGE UP (ref 80–100)
NRBC # BLD: 0 /100 WBCS — SIGNIFICANT CHANGE UP
NRBC # FLD: 0 K/UL — SIGNIFICANT CHANGE UP
PLATELET # BLD AUTO: 190 K/UL — SIGNIFICANT CHANGE UP (ref 150–400)
POTASSIUM SERPL-MCNC: 4.4 MMOL/L — SIGNIFICANT CHANGE UP (ref 3.5–5.3)
POTASSIUM SERPL-SCNC: 4.4 MMOL/L — SIGNIFICANT CHANGE UP (ref 3.5–5.3)
PROTHROM AB SERPL-ACNC: 14.4 SEC — HIGH (ref 10.6–13.6)
RBC # BLD: 4.19 M/UL — LOW (ref 4.2–5.8)
RBC # FLD: 13.9 % — SIGNIFICANT CHANGE UP (ref 10.3–14.5)
SODIUM SERPL-SCNC: 138 MMOL/L — SIGNIFICANT CHANGE UP (ref 135–145)
WBC # BLD: 4.33 K/UL — SIGNIFICANT CHANGE UP (ref 3.8–10.5)
WBC # FLD AUTO: 4.33 K/UL — SIGNIFICANT CHANGE UP (ref 3.8–10.5)

## 2021-06-15 PROCEDURE — 93971 EXTREMITY STUDY: CPT | Mod: 26

## 2021-06-15 PROCEDURE — 99233 SBSQ HOSP IP/OBS HIGH 50: CPT

## 2021-06-15 RX ORDER — WARFARIN SODIUM 2.5 MG/1
7.5 TABLET ORAL ONCE
Refills: 0 | Status: COMPLETED | OUTPATIENT
Start: 2021-06-15 | End: 2021-06-15

## 2021-06-15 RX ADMIN — SIMVASTATIN 20 MILLIGRAM(S): 20 TABLET, FILM COATED ORAL at 21:48

## 2021-06-15 RX ADMIN — Medication 100 MILLIGRAM(S): at 07:35

## 2021-06-15 RX ADMIN — TAMSULOSIN HYDROCHLORIDE 0.4 MILLIGRAM(S): 0.4 CAPSULE ORAL at 21:51

## 2021-06-15 RX ADMIN — Medication 25 MICROGRAM(S): at 07:35

## 2021-06-15 RX ADMIN — HEPARIN SODIUM 800 UNIT(S)/HR: 5000 INJECTION INTRAVENOUS; SUBCUTANEOUS at 11:52

## 2021-06-15 RX ADMIN — Medication 100 MILLIGRAM(S): at 11:51

## 2021-06-15 RX ADMIN — Medication 200 MILLIGRAM(S): at 21:48

## 2021-06-15 RX ADMIN — Medication 32.4 MILLIGRAM(S): at 07:35

## 2021-06-15 RX ADMIN — WARFARIN SODIUM 7.5 MILLIGRAM(S): 2.5 TABLET ORAL at 19:29

## 2021-06-15 RX ADMIN — Medication 32.4 MILLIGRAM(S): at 19:28

## 2021-06-15 NOTE — PROGRESS NOTE ADULT - PROBLEM SELECTOR PLAN 1
- Patient subtherapeutic despite taking prescribed dose coumadin. This is expected as patient is concurrently taking dilantin and phenobarbital.   - Cont heparin gtt.  - INR remains subtherapeutic at this time; cont to monitor INR daily  - vascular follg  - f/u hypercoagulable wkup   - CT neg for malignancy  - fobt neg  - LE doppler without DVT; conflicting info; pt has report and disk from outside study; this should be reviewed by radiology and vascular   - Team d/w cardiology in regards to DOAC not candidate due to interaction with antiepileptic meds  - heparin bridge to coumadin goal INR 2-3  - f/u vascular recs - Patient subtherapeutic despite taking prescribed dose coumadin. This is expected as patient is concurrently taking dilantin and phenobarbital.   - Cont heparin gtt.  - INR remains subtherapeutic at this time; cont to monitor INR daily  - vascular follg  - anticardiolipin ab and IgG and IgM-neg   - CT neg for malignancy  - fobt neg  - LE doppler without DVT; conflicting info; pt has report and disk from outside study; this should be reviewed by radiology and vascular   - Team d/w cardiology in regards to DOAC not candidate due to interaction with antiepileptic meds  - heparin bridge to coumadin goal INR 2-3  - f/u vascular recs - Patient subtherapeutic despite taking prescribed dose coumadin. This is expected as patient is concurrently taking dilantin and phenobarbital.   - Cont heparin gtt.  - INR remains subtherapeutic at this time; cont to monitor INR daily  - vascular follg  - anticardiolipin ab and IgG and IgM-neg   - CT neg for malignancy  - fobt neg  - LE doppler without DVT; conflicting info; pt has report and disk from outside study; this should be reviewed by radiology and vascular   - Team d/w cardiology in regards to DOAC not candidate due to interaction with antiepileptic meds. Pt unwilling to inject with lovenox   - heparin bridge to coumadin goal INR 2-3. 5--5--7.5 coumadin 7.5 mg today   - f/u vascular recs

## 2021-06-15 NOTE — PROGRESS NOTE ADULT - SUBJECTIVE AND OBJECTIVE BOX
Patient is a 71y old  Male who presents with a chief complaint of DVT (15 Bobby 2021 07:32)      SUBJECTIVE / OVERNIGHT EVENTS: Pt in NAD no acute events ON   ADDITIONAL REVIEW OF SYSTEMS: denies CP/SOB     MEDICATIONS  (STANDING):  heparin  Infusion.  Unit(s)/Hr (17 mL/Hr) IV Continuous <Continuous>  levothyroxine 25 MICROGram(s) Oral daily  metoprolol tartrate 100 milliGRAM(s) Oral daily  PHENobarbital 32.4 milliGRAM(s) Oral two times a day  phenytoin   Capsule 200 milliGRAM(s) Oral at bedtime  phenytoin   Capsule 100 milliGRAM(s) Oral daily  simvastatin 20 milliGRAM(s) Oral at bedtime  tamsulosin 0.4 milliGRAM(s) Oral at bedtime  warfarin 7.5 milliGRAM(s) Oral once    MEDICATIONS  (PRN):  heparin   Injectable 7500 Unit(s) IV Push every 6 hours PRN For aPTT less than 40  heparin   Injectable 3500 Unit(s) IV Push every 6 hours PRN For aPTT between 40 - 57      CAPILLARY BLOOD GLUCOSE        I&O's Summary      PHYSICAL EXAM:  Vital Signs Last 24 Hrs  T(C): 36.5 (15 Bobby 2021 06:16), Max: 36.5 (15 Bobby 2021 06:16)  T(F): 97.7 (15 Bobby 2021 06:16), Max: 97.7 (15 Bobby 2021 06:16)  HR: 71 (15 Bobby 2021 07:20) (65 - 71)  BP: 118/61 (15 Bobby 2021 07:20) (110/69 - 120/69)  BP(mean): --  RR: 16 (15 Bobby 2021 06:16) (16 - 16)  SpO2: 97% (15 Bobby 2021 06:16) (96% - 97%)    CONSTITUTIONAL: NAD, well-developed, well-groomed  EYES:  conjunctiva and sclera clear  ENMT: Moist oral mucosa,   NECK: Supple, no palpable masses; no thyromegaly  RESPIRATORY: Normal respiratory effort; lungs are clear to auscultation bilaterally  CARDIOVASCULAR: Regular rate and rhythm, normal S1 and S2,   ABDOMEN: Nontender to palpation, normoactive bowel sounds, no rebound/guarding; No hepatosplenomegaly  MUSCULOSKELETAL:  Normal gait; no clubbing or cyanosis of digits; no joint swelling or tenderness to palpation  PSYCH: A+O to person, place, and time; affect appropriate  NEUROLOGY: CN 2-12 are intact and symmetric; no gross sensory deficits       LABS:                        13.1   4.33  )-----------( 190      ( 15 Bobby 2021 07:35 )             39.9     06-15    138  |  104  |  17  ----------------------------<  83  4.4   |  25  |  0.86    Ca    9.0      15 Bobby 2021 07:35  Phos  3.6     06-14  Mg     2.1     06-14      PT/INR - ( 15 Bobby 2021 07:35 )   PT: 14.4 sec;   INR: 1.27 ratio         PTT - ( 15 Bobby 2021 07:35 )  PTT:61.4 sec            RADIOLOGY & ADDITIONAL TESTS:  Results Reviewed:   Imaging Personally Reviewed:  Electrocardiogram Personally Reviewed:    COORDINATION OF CARE:  Care Discussed with Consultants/Other Providers [Y/N]:  Prior or Outpatient Records Reviewed [Y/N]:

## 2021-06-15 NOTE — PROGRESS NOTE ADULT - SUBJECTIVE AND OBJECTIVE BOX
Cardiovascular Disease Progress Note    Overnight events: No acute events overnight.  no new cardiac sx  Otherwise review of systems negative    Objective Findings:  T(C): 36.5 (06-15-21 @ 06:16), Max: 36.5 (06-15-21 @ 06:16)  HR: 71 (06-15-21 @ 07:20) (65 - 71)  BP: 118/61 (06-15-21 @ 07:20) (110/69 - 120/69)  RR: 16 (06-15-21 @ 06:16) (16 - 16)  SpO2: 97% (06-15-21 @ 06:16) (96% - 97%)  Wt(kg): --  Daily     Daily       Physical Exam:  Gen: NAD  HEENT: EOMI  CV: RRR, normal S1 + S2, no m/r/g  Lungs: CTAB  Abd: soft, non-tender  Ext: No edema    Telemetry:    Laboratory Data:                        13.8   5.11  )-----------( 188      ( 14 Jun 2021 06:43 )             41.0     06-14    139  |  104  |  17  ----------------------------<  86  4.3   |  24  |  0.77    Ca    8.6      14 Jun 2021 06:43  Phos  3.6     06-14  Mg     2.1     06-14      PT/INR - ( 14 Jun 2021 06:43 )   PT: 14.1 sec;   INR: 1.25 ratio         PTT - ( 14 Jun 2021 06:43 )  PTT:60.6 sec          Inpatient Medications:  MEDICATIONS  (STANDING):  heparin  Infusion.  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 would obtain thrombophilia panel, ct chest/abd/pelvis without acute findings, fobt neg, check psa  therapeutic ac with warfarin, goal inr 2-3. would dc home on lovenox bridge to coumadin  le david duplex neg for dvt, doubt failure of coumadin, c/w ac for now          Over 25 minutes spent on total encounter; more than 50% of the visit was spent counseling and/or coordinating care by the attending physician.      Carlo Mason MD   Cardiovascular Disease  [phone]

## 2021-06-15 NOTE — PROGRESS NOTE ADULT - PROBLEM SELECTOR PLAN 3
- S/p repair in 2011.  - No signs of acute decompensated heart failure at this time.  - Closely monitor I&Os.   - Close cardiology f/u.  - echo pending- f/u

## 2021-06-16 LAB
ANION GAP SERPL CALC-SCNC: 12 MMOL/L — SIGNIFICANT CHANGE UP (ref 7–14)
APTT BLD: 62.4 SEC — HIGH (ref 27–36.3)
BUN SERPL-MCNC: 17 MG/DL — SIGNIFICANT CHANGE UP (ref 7–23)
CALCIUM SERPL-MCNC: 9.1 MG/DL — SIGNIFICANT CHANGE UP (ref 8.4–10.5)
CHLORIDE SERPL-SCNC: 102 MMOL/L — SIGNIFICANT CHANGE UP (ref 98–107)
CO2 SERPL-SCNC: 24 MMOL/L — SIGNIFICANT CHANGE UP (ref 22–31)
CREAT SERPL-MCNC: 0.88 MG/DL — SIGNIFICANT CHANGE UP (ref 0.5–1.3)
GLUCOSE SERPL-MCNC: 87 MG/DL — SIGNIFICANT CHANGE UP (ref 70–99)
HCT VFR BLD CALC: 41.7 % — SIGNIFICANT CHANGE UP (ref 39–50)
HGB BLD-MCNC: 13.6 G/DL — SIGNIFICANT CHANGE UP (ref 13–17)
INR BLD: 1.37 RATIO — HIGH (ref 0.88–1.16)
MCHC RBC-ENTMCNC: 31.4 PG — SIGNIFICANT CHANGE UP (ref 27–34)
MCHC RBC-ENTMCNC: 32.6 GM/DL — SIGNIFICANT CHANGE UP (ref 32–36)
MCV RBC AUTO: 96.3 FL — SIGNIFICANT CHANGE UP (ref 80–100)
NRBC # BLD: 0 /100 WBCS — SIGNIFICANT CHANGE UP
NRBC # FLD: 0 K/UL — SIGNIFICANT CHANGE UP
PLATELET # BLD AUTO: 185 K/UL — SIGNIFICANT CHANGE UP (ref 150–400)
POTASSIUM SERPL-MCNC: 4.2 MMOL/L — SIGNIFICANT CHANGE UP (ref 3.5–5.3)
POTASSIUM SERPL-SCNC: 4.2 MMOL/L — SIGNIFICANT CHANGE UP (ref 3.5–5.3)
PROTHROM AB SERPL-ACNC: 15.4 SEC — HIGH (ref 10.6–13.6)
RBC # BLD: 4.33 M/UL — SIGNIFICANT CHANGE UP (ref 4.2–5.8)
RBC # FLD: 13.9 % — SIGNIFICANT CHANGE UP (ref 10.3–14.5)
SODIUM SERPL-SCNC: 138 MMOL/L — SIGNIFICANT CHANGE UP (ref 135–145)
WBC # BLD: 5.03 K/UL — SIGNIFICANT CHANGE UP (ref 3.8–10.5)
WBC # FLD AUTO: 5.03 K/UL — SIGNIFICANT CHANGE UP (ref 3.8–10.5)

## 2021-06-16 PROCEDURE — 99233 SBSQ HOSP IP/OBS HIGH 50: CPT

## 2021-06-16 RX ORDER — WARFARIN SODIUM 2.5 MG/1
10 TABLET ORAL ONCE
Refills: 0 | Status: COMPLETED | OUTPATIENT
Start: 2021-06-16 | End: 2021-06-16

## 2021-06-16 RX ADMIN — TAMSULOSIN HYDROCHLORIDE 0.4 MILLIGRAM(S): 0.4 CAPSULE ORAL at 21:27

## 2021-06-16 RX ADMIN — WARFARIN SODIUM 10 MILLIGRAM(S): 2.5 TABLET ORAL at 17:05

## 2021-06-16 RX ADMIN — Medication 200 MILLIGRAM(S): at 21:27

## 2021-06-16 RX ADMIN — Medication 25 MICROGRAM(S): at 06:10

## 2021-06-16 RX ADMIN — Medication 32.4 MILLIGRAM(S): at 06:12

## 2021-06-16 RX ADMIN — HEPARIN SODIUM 800 UNIT(S)/HR: 5000 INJECTION INTRAVENOUS; SUBCUTANEOUS at 07:18

## 2021-06-16 RX ADMIN — Medication 100 MILLIGRAM(S): at 12:11

## 2021-06-16 RX ADMIN — SIMVASTATIN 20 MILLIGRAM(S): 20 TABLET, FILM COATED ORAL at 21:28

## 2021-06-16 RX ADMIN — Medication 100 MILLIGRAM(S): at 06:10

## 2021-06-16 RX ADMIN — Medication 32.4 MILLIGRAM(S): at 17:08

## 2021-06-16 NOTE — DIETITIAN INITIAL EVALUATION ADULT. - PERTINENT LABORATORY DATA
06-16 Na138 mmol/L Glu 87 mg/dL K+ 4.2 mmol/L Cr  0.88 mg/dL BUN 17 mg/dL 06-14 Phos 3.6 mg/dL 06-10 Alb 4.0 g/dL    Hemoglobin A1C, Whole Blood: 5.3: Method: Immunoassay       Reference Range                4.0-5.6%       High risk (prediabetic)        5.7-6.4%       Diabetic, diagnostic             >=6.5%       ADA diabetic treatment goal       <7.0%  The Hemoglobin A1c reference ranges are basedon the 2010 recommendations  of  The American Diabetes Association.  Interpretation may vary for children  and  adolescent % (07.11.17 @ 09:38)

## 2021-06-16 NOTE — PROGRESS NOTE ADULT - SUBJECTIVE AND OBJECTIVE BOX
Patient is a 71y old  Male who presents with a chief complaint of DVT (16 Jun 2021 11:38)      SUBJECTIVE / OVERNIGHT EVENTS: pt in NAD no acute events ON  ADDITIONAL REVIEW OF SYSTEMS: denies CP/SOB    MEDICATIONS  (STANDING):  heparin  Infusion.  Unit(s)/Hr (17 mL/Hr) IV Continuous <Continuous>  levothyroxine 25 MICROGram(s) Oral daily  metoprolol tartrate 100 milliGRAM(s) Oral daily  PHENobarbital 32.4 milliGRAM(s) Oral two times a day  phenytoin   Capsule 200 milliGRAM(s) Oral at bedtime  phenytoin   Capsule 100 milliGRAM(s) Oral daily  simvastatin 20 milliGRAM(s) Oral at bedtime  tamsulosin 0.4 milliGRAM(s) Oral at bedtime    MEDICATIONS  (PRN):  heparin   Injectable 7500 Unit(s) IV Push every 6 hours PRN For aPTT less than 40  heparin   Injectable 3500 Unit(s) IV Push every 6 hours PRN For aPTT between 40 - 57      CAPILLARY BLOOD GLUCOSE        I&O's Summary    15 Bobby 2021 07:01  -  16 Jun 2021 07:00  --------------------------------------------------------  IN: 188 mL / OUT: 600 mL / NET: -412 mL        PHYSICAL EXAM:  Vital Signs Last 24 Hrs  T(C): 36.5 (16 Jun 2021 06:03), Max: 36.5 (15 Bobby 2021 21:22)  T(F): 97.7 (16 Jun 2021 06:03), Max: 97.7 (15 Bobby 2021 21:22)  HR: 70 (16 Jun 2021 06:03) (68 - 70)  BP: 110/65 (16 Jun 2021 06:03) (110/65 - 147/81)  BP(mean): --  RR: 16 (16 Jun 2021 06:03) (16 - 18)  SpO2: 100% (16 Jun 2021 06:03) (97% - 100%)    CONSTITUTIONAL: NAD, well-developed, well-groomed  EYES:  conjunctiva and sclera clear  ENMT: Moist oral mucosa,   NECK: Supple, no palpable masses; no thyromegaly  RESPIRATORY: Normal respiratory effort; lungs are clear to auscultation bilaterally  CARDIOVASCULAR: Regular rate and rhythm, normal S1 and S2,   ABDOMEN: Nontender to palpation, normoactive bowel sounds, no rebound/guarding; No hepatosplenomegaly  MUSCULOSKELETAL:  Normal gait; no clubbing or cyanosis of digits; no joint swelling or tenderness to palpation  PSYCH: A+O to person, place, and time; affect appropriate  NEUROLOGY: CN 2-12 are intact and symmetric; no gross sensory deficits     LABS:                        13.6   5.03  )-----------( 185      ( 16 Jun 2021 06:28 )             41.7     06-16    138  |  102  |  17  ----------------------------<  87  4.2   |  24  |  0.88    Ca    9.1      16 Jun 2021 06:28      PT/INR - ( 16 Jun 2021 06:28 )   PT: 15.4 sec;   INR: 1.37 ratio         PTT - ( 16 Jun 2021 06:28 )  PTT:62.4 sec            RADIOLOGY & ADDITIONAL TESTS:  Results Reviewed: < from: VA Duplex Ext Veins Lower Comp, Left. (06.15.21 @ 16:33) >  ------------------------------------------------------------------------  Summary/Impressions:  No evidence of deep vein thrombosis in the left lower  extremity.  No interval change since the prior study from 06/11/21.  ------------------------------------------------------------------------  Confirmed on  6/15/2021 - 6:25 PM by Bharat Cosby MD, FAC,  MATILDA, RPVI  By signing this report, the attending physician certifies  that he or she has personally supervised and interpreted  the vascular study and has reviewed and or edited and  agrees with the written comments contained within the    < end of copied text >    Imaging Personally Reviewed:  Electrocardiogram Personally Reviewed:    COORDINATION OF CARE:  Care Discussed with Consultants/Other Providers [Y/N]:  Prior or Outpatient Records Reviewed [Y/N]:

## 2021-06-16 NOTE — PROGRESS NOTE ADULT - ASSESSMENT
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

## 2021-06-16 NOTE — PROGRESS NOTE ADULT - SUBJECTIVE AND OBJECTIVE BOX
Cardiovascular Disease Progress Note    Overnight events: No acute events overnight.  no new cardiac sx  Otherwise review of systems negative    Objective Findings:  T(C): 36.5 (06-16-21 @ 06:03), Max: 36.5 (06-15-21 @ 21:22)  HR: 70 (06-16-21 @ 06:03) (68 - 70)  BP: 110/65 (06-16-21 @ 06:03) (110/65 - 147/81)  RR: 16 (06-16-21 @ 06:03) (16 - 18)  SpO2: 100% (06-16-21 @ 06:03) (97% - 100%)  Wt(kg): --  Daily     Daily       Physical Exam:  Gen: NAD  HEENT: EOMI  CV: RRR, normal S1 + S2, no m/r/g  Lungs: CTAB  Abd: soft, non-tender  Ext: No edema    Telemetry:    Laboratory Data:                        13.6   5.03  )-----------( 185      ( 16 Jun 2021 06:28 )             41.7     06-16    138  |  102  |  17  ----------------------------<  87  4.2   |  24  |  0.88    Ca    9.1      16 Jun 2021 06:28      PT/INR - ( 16 Jun 2021 06:28 )   PT: 15.4 sec;   INR: 1.37 ratio         PTT - ( 16 Jun 2021 06:28 )  PTT:62.4 sec          Inpatient Medications:  MEDICATIONS  (STANDING):  heparin  Infusion.  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 f/u  thrombophilia panel, ct chest/abd/pelvis without acute findings, fobt neg,  psa  therapeutic ac with warfarin, goal inr 2-3. patient refusing lovenox bridge  le david duplex neg for dvt, doubt failure of coumadin, c/w ac for now      Over 25 minutes spent on total encounter; more than 50% of the visit was spent counseling and/or coordinating care by the attending physician.      Carlo Mason MD   Cardiovascular Disease  [phone]

## 2021-06-16 NOTE — PROGRESS NOTE ADULT - PROBLEM SELECTOR PLAN 1
- Patient subtherapeutic despite taking prescribed dose coumadin. This is expected as patient is concurrently taking dilantin and phenobarbital.   - Cont heparin gtt.  - INR remains subtherapeutic at this time; cont to monitor INR daily  - vascular following   - anticardiolipin ab and IgG and IgM-neg;   - CT neg for malignancy  - fobt neg  - LE doppler without DVT; repeat   - Team d/w cardiology in regards to DOAC not candidate due to interaction with antiepileptic meds. Pt unwilling to inject with lovenox   - INR 1.37  - heparin bridge to coumadin goal INR 2-3. 5--5--7.5-- 7.5 mg ---coumadin 10mg today   - f/u vascular recs - Patient subtherapeutic despite taking prescribed dose coumadin. This is expected as patient is concurrently taking dilantin and phenobarbital.   - anticardiolipin ab and IgG and IgM-neg; protein c/s low will need to be repeat as outpt   - CT neg for malignancy  - fobt neg  - LE doppler without DVT X 2   - Team d/w cardiology in regards to DOAC not candidate due to interaction with antiepileptic meds. Pt unwilling to inject with lovenox   - Cont heparin gtt.  - INR remains subtherapeutic at this time; cont to monitor INR daily; educated on diet when on coumadin   - INR 1.37  - heparin bridge to coumadin goal INR 2-3. 5--5--7.5-- 7.5 mg ---coumadin 10mg today   - case d/w Dr. Cosby will have him review outpt US if poss DVT noted; as US x 2 in house have been neg.  - f/u vascular recs

## 2021-06-16 NOTE — DIETITIAN INITIAL EVALUATION ADULT. - PERTINENT MEDS FT
levothyroxine  metoprolol tartrate  PHENobarbital  phenytoin   Capsule  phenytoin   Capsule  simvastatin  tamsulosin

## 2021-06-16 NOTE — DIETITIAN INITIAL EVALUATION ADULT. - OTHER INFO
Met with patient at bedside. Reports excellent appetite and PO intake % in-house. Patient denies any nausea/vomiting/diarrhea/constipation or difficulty chewing and swallowing. NKFA. MAINOR provided the Pt with verbal and written coumadin/vitamin k education, which he verbalized comprehension of.

## 2021-06-17 LAB
ANION GAP SERPL CALC-SCNC: 11 MMOL/L — SIGNIFICANT CHANGE UP (ref 7–14)
APTT BLD: 64.5 SEC — HIGH (ref 27–36.3)
BUN SERPL-MCNC: 21 MG/DL — SIGNIFICANT CHANGE UP (ref 7–23)
CALCIUM SERPL-MCNC: 9.1 MG/DL — SIGNIFICANT CHANGE UP (ref 8.4–10.5)
CHLORIDE SERPL-SCNC: 101 MMOL/L — SIGNIFICANT CHANGE UP (ref 98–107)
CO2 SERPL-SCNC: 24 MMOL/L — SIGNIFICANT CHANGE UP (ref 22–31)
CREAT SERPL-MCNC: 0.84 MG/DL — SIGNIFICANT CHANGE UP (ref 0.5–1.3)
GLUCOSE SERPL-MCNC: 85 MG/DL — SIGNIFICANT CHANGE UP (ref 70–99)
HCT VFR BLD CALC: 40 % — SIGNIFICANT CHANGE UP (ref 39–50)
HGB BLD-MCNC: 13.3 G/DL — SIGNIFICANT CHANGE UP (ref 13–17)
INR BLD: 1.44 RATIO — HIGH (ref 0.88–1.16)
MAGNESIUM SERPL-MCNC: 2.3 MG/DL — SIGNIFICANT CHANGE UP (ref 1.6–2.6)
MCHC RBC-ENTMCNC: 31.6 PG — SIGNIFICANT CHANGE UP (ref 27–34)
MCHC RBC-ENTMCNC: 33.3 GM/DL — SIGNIFICANT CHANGE UP (ref 32–36)
MCV RBC AUTO: 95 FL — SIGNIFICANT CHANGE UP (ref 80–100)
NRBC # BLD: 0 /100 WBCS — SIGNIFICANT CHANGE UP
NRBC # FLD: 0 K/UL — SIGNIFICANT CHANGE UP
PHOSPHATE SERPL-MCNC: 3.3 MG/DL — SIGNIFICANT CHANGE UP (ref 2.5–4.5)
PLATELET # BLD AUTO: 183 K/UL — SIGNIFICANT CHANGE UP (ref 150–400)
POTASSIUM SERPL-MCNC: 4.4 MMOL/L — SIGNIFICANT CHANGE UP (ref 3.5–5.3)
POTASSIUM SERPL-SCNC: 4.4 MMOL/L — SIGNIFICANT CHANGE UP (ref 3.5–5.3)
PROTHROM AB SERPL-ACNC: 16.3 SEC — HIGH (ref 10.6–13.6)
RBC # BLD: 4.21 M/UL — SIGNIFICANT CHANGE UP (ref 4.2–5.8)
RBC # FLD: 14.2 % — SIGNIFICANT CHANGE UP (ref 10.3–14.5)
SODIUM SERPL-SCNC: 136 MMOL/L — SIGNIFICANT CHANGE UP (ref 135–145)
WBC # BLD: 4.72 K/UL — SIGNIFICANT CHANGE UP (ref 3.8–10.5)
WBC # FLD AUTO: 4.72 K/UL — SIGNIFICANT CHANGE UP (ref 3.8–10.5)

## 2021-06-17 PROCEDURE — 99233 SBSQ HOSP IP/OBS HIGH 50: CPT

## 2021-06-17 RX ORDER — PHENOBARBITAL 60 MG
32.4 TABLET ORAL
Refills: 0 | Status: DISCONTINUED | OUTPATIENT
Start: 2021-06-17 | End: 2021-06-17

## 2021-06-17 RX ORDER — PHENOBARBITAL 60 MG
32.4 TABLET ORAL
Refills: 0 | Status: DISCONTINUED | OUTPATIENT
Start: 2021-06-17 | End: 2021-06-18

## 2021-06-17 RX ORDER — WARFARIN SODIUM 2.5 MG/1
10 TABLET ORAL ONCE
Refills: 0 | Status: COMPLETED | OUTPATIENT
Start: 2021-06-17 | End: 2021-06-17

## 2021-06-17 RX ADMIN — Medication 200 MILLIGRAM(S): at 21:39

## 2021-06-17 RX ADMIN — WARFARIN SODIUM 10 MILLIGRAM(S): 2.5 TABLET ORAL at 18:01

## 2021-06-17 RX ADMIN — Medication 100 MILLIGRAM(S): at 05:42

## 2021-06-17 RX ADMIN — Medication 32.4 MILLIGRAM(S): at 05:41

## 2021-06-17 RX ADMIN — SIMVASTATIN 20 MILLIGRAM(S): 20 TABLET, FILM COATED ORAL at 21:39

## 2021-06-17 RX ADMIN — Medication 32.4 MILLIGRAM(S): at 18:01

## 2021-06-17 RX ADMIN — Medication 25 MICROGRAM(S): at 05:42

## 2021-06-17 RX ADMIN — TAMSULOSIN HYDROCHLORIDE 0.4 MILLIGRAM(S): 0.4 CAPSULE ORAL at 21:39

## 2021-06-17 RX ADMIN — HEPARIN SODIUM 800 UNIT(S)/HR: 5000 INJECTION INTRAVENOUS; SUBCUTANEOUS at 07:59

## 2021-06-17 RX ADMIN — Medication 100 MILLIGRAM(S): at 12:23

## 2021-06-17 NOTE — PROGRESS NOTE ADULT - SUBJECTIVE AND OBJECTIVE BOX
Cardiovascular Disease Progress Note    Overnight events: No acute events overnight. no new cardiac sx    Otherwise review of systems negative    Objective Findings:  T(C): 36.2 (06-17-21 @ 05:35), Max: 37.3 (06-16-21 @ 13:04)  HR: 69 (06-17-21 @ 05:35) (69 - 73)  BP: 149/82 (06-17-21 @ 05:35) (113/60 - 149/82)  RR: 16 (06-17-21 @ 05:35) (16 - 18)  SpO2: 97% (06-17-21 @ 05:35) (97% - 97%)  Wt(kg): --  Daily     Daily       Physical Exam:  Gen: NAD  HEENT: EOMI  CV: RRR, normal S1 + S2, no m/r/g  Lungs: CTAB  Abd: soft, non-tender  Ext: No edema    Telemetry:    Laboratory Data:                        13.3   4.72  )-----------( 183      ( 17 Jun 2021 06:53 )             40.0     06-17    136  |  101  |  21  ----------------------------<  85  4.4   |  24  |  0.84    Ca    9.1      17 Jun 2021 06:53  Phos  3.3     06-17  Mg     2.3     06-17      PT/INR - ( 17 Jun 2021 06:53 )   PT: 16.3 sec;   INR: 1.44 ratio         PTT - ( 17 Jun 2021 06:53 )  PTT:64.5 sec          Inpatient Medications:  MEDICATIONS  (STANDING):  heparin  Infusion.  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 ct chest/abd/pelvis without acute findings, fobt neg,  psa  therapeutic ac with warfarin, goal inr 2-3. patient refusing lovenox bridge  le david duplex neg for dvt, doubt failure of coumadin, c/w ac for now  dispo planning once inr therapeutic        Over 25 minutes spent on total encounter; more than 50% of the visit was spent counseling and/or coordinating care by the attending physician.      Carlo Mason MD   Cardiovascular Disease  [phone]

## 2021-06-17 NOTE — PROGRESS NOTE ADULT - SUBJECTIVE AND OBJECTIVE BOX
Patient is a 71y old  Male who presents with a chief complaint of DVT (17 Jun 2021 08:04)      SUBJECTIVE / OVERNIGHT EVENTS: pt doing well no acute events ON   ADDITIONAL REVIEW OF SYSTEMS: denies CP/SOB     MEDICATIONS  (STANDING):  heparin  Infusion.  Unit(s)/Hr (17 mL/Hr) IV Continuous <Continuous>  levothyroxine 25 MICROGram(s) Oral daily  metoprolol tartrate 100 milliGRAM(s) Oral daily  PHENobarbital 32.4 milliGRAM(s) Oral two times a day  phenytoin   Capsule 200 milliGRAM(s) Oral at bedtime  phenytoin   Capsule 100 milliGRAM(s) Oral daily  simvastatin 20 milliGRAM(s) Oral at bedtime  tamsulosin 0.4 milliGRAM(s) Oral at bedtime  warfarin 10 milliGRAM(s) Oral once    MEDICATIONS  (PRN):  heparin   Injectable 7500 Unit(s) IV Push every 6 hours PRN For aPTT less than 40  heparin   Injectable 3500 Unit(s) IV Push every 6 hours PRN For aPTT between 40 - 57      CAPILLARY BLOOD GLUCOSE        I&O's Summary    16 Jun 2021 07:01  -  17 Jun 2021 07:00  --------------------------------------------------------  IN: 750 mL / OUT: 400 mL / NET: 350 mL    17 Jun 2021 07:01  -  17 Jun 2021 09:41  --------------------------------------------------------  IN: 328 mL / OUT: 1200 mL / NET: -872 mL        PHYSICAL EXAM:  Vital Signs Last 24 Hrs  T(C): 36.2 (17 Jun 2021 05:35), Max: 37.3 (16 Jun 2021 13:04)  T(F): 97.1 (17 Jun 2021 05:35), Max: 99.1 (16 Jun 2021 13:04)  HR: 69 (17 Jun 2021 05:35) (69 - 73)  BP: 149/82 (17 Jun 2021 05:35) (113/60 - 149/82)  BP(mean): --  RR: 16 (17 Jun 2021 05:35) (16 - 18)  SpO2: 97% (17 Jun 2021 05:35) (97% - 97%)    CONSTITUTIONAL: NAD, well-developed, well-groomed  EYES:  conjunctiva and sclera clear  ENMT: Moist oral mucosa,   NECK: Supple, no palpable masses; no thyromegaly  RESPIRATORY: Normal respiratory effort; lungs are clear to auscultation bilaterally  CARDIOVASCULAR: Regular rate and rhythm, normal S1 and S2,   ABDOMEN: Nontender to palpation, normoactive bowel sounds, no rebound/guarding; No hepatosplenomegaly  MUSCULOSKELETAL:  Normal gait; no clubbing or cyanosis of digits; no joint swelling or tenderness to palpation  PSYCH: A+O to person, place, and time; affect appropriate  NEUROLOGY: CN 2-12 are intact and symmetric; no gross sensory deficits     LABS:                        13.3   4.72  )-----------( 183      ( 17 Jun 2021 06:53 )             40.0     06-17    136  |  101  |  21  ----------------------------<  85  4.4   |  24  |  0.84    Ca    9.1      17 Jun 2021 06:53  Phos  3.3     06-17  Mg     2.3     06-17      PT/INR - ( 17 Jun 2021 06:53 )   PT: 16.3 sec;   INR: 1.44 ratio         PTT - ( 17 Jun 2021 06:53 )  PTT:64.5 sec            RADIOLOGY & ADDITIONAL TESTS:  Results Reviewed:   Imaging Personally Reviewed:  Electrocardiogram Personally Reviewed:    COORDINATION OF CARE:  Care Discussed with Consultants/Other Providers [Y/N]:  Prior or Outpatient Records Reviewed [Y/N]:

## 2021-06-17 NOTE — PROGRESS NOTE ADULT - PROBLEM SELECTOR PLAN 1
- Patient subtherapeutic despite taking prescribed dose coumadin. This is expected as patient is concurrently taking dilantin and phenobarbital.   - anticardiolipin ab and IgG and IgM-neg; protein c/s low will need to be repeat as outpt   - CT C/A/P neg for malignancy  - fobt neg  - LE doppler without DVT X 2   - Team d/w cardiology in regards to DOAC not candidate due to interaction with antiepileptic meds. Pt unwilling to inject with lovenox   - Cont heparin gtt.  - INR remains subtherapeutic at this time; cont to monitor INR daily; educated on diet when on coumadin   - INR 1.37--1.44   - heparin bridge to coumadin goal INR 2-3. 5--5--7.5-- 7.5 mg ---10mg coumadin 10mg today   - case d/w Dr. Cosby will have him review outpt US if poss DVT noted; as US x 2 in house have been neg.  - f/u vascular recs

## 2021-06-18 ENCOUNTER — TRANSCRIPTION ENCOUNTER (OUTPATIENT)
Age: 71
End: 2021-06-18

## 2021-06-18 VITALS
DIASTOLIC BLOOD PRESSURE: 71 MMHG | OXYGEN SATURATION: 96 % | RESPIRATION RATE: 16 BRPM | HEART RATE: 74 BPM | TEMPERATURE: 98 F | SYSTOLIC BLOOD PRESSURE: 116 MMHG

## 2021-06-18 LAB
ANION GAP SERPL CALC-SCNC: 12 MMOL/L — SIGNIFICANT CHANGE UP (ref 7–14)
APTT BLD: 70.5 SEC — HIGH (ref 27–36.3)
BUN SERPL-MCNC: 24 MG/DL — HIGH (ref 7–23)
CALCIUM SERPL-MCNC: 8.7 MG/DL — SIGNIFICANT CHANGE UP (ref 8.4–10.5)
CHLORIDE SERPL-SCNC: 103 MMOL/L — SIGNIFICANT CHANGE UP (ref 98–107)
CO2 SERPL-SCNC: 23 MMOL/L — SIGNIFICANT CHANGE UP (ref 22–31)
CREAT SERPL-MCNC: 0.87 MG/DL — SIGNIFICANT CHANGE UP (ref 0.5–1.3)
GLUCOSE SERPL-MCNC: 88 MG/DL — SIGNIFICANT CHANGE UP (ref 70–99)
HCT VFR BLD CALC: 39.8 % — SIGNIFICANT CHANGE UP (ref 39–50)
HGB BLD-MCNC: 13.2 G/DL — SIGNIFICANT CHANGE UP (ref 13–17)
INR BLD: 1.69 RATIO — HIGH (ref 0.88–1.16)
MAGNESIUM SERPL-MCNC: 2.3 MG/DL — SIGNIFICANT CHANGE UP (ref 1.6–2.6)
MCHC RBC-ENTMCNC: 31.6 PG — SIGNIFICANT CHANGE UP (ref 27–34)
MCHC RBC-ENTMCNC: 33.2 GM/DL — SIGNIFICANT CHANGE UP (ref 32–36)
MCV RBC AUTO: 95.2 FL — SIGNIFICANT CHANGE UP (ref 80–100)
NRBC # BLD: 0 /100 WBCS — SIGNIFICANT CHANGE UP
NRBC # FLD: 0 K/UL — SIGNIFICANT CHANGE UP
PHOSPHATE SERPL-MCNC: 3.5 MG/DL — SIGNIFICANT CHANGE UP (ref 2.5–4.5)
PLATELET # BLD AUTO: 185 K/UL — SIGNIFICANT CHANGE UP (ref 150–400)
POTASSIUM SERPL-MCNC: 4.1 MMOL/L — SIGNIFICANT CHANGE UP (ref 3.5–5.3)
POTASSIUM SERPL-SCNC: 4.1 MMOL/L — SIGNIFICANT CHANGE UP (ref 3.5–5.3)
PROTHROM AB SERPL-ACNC: 18.8 SEC — HIGH (ref 10.6–13.6)
RBC # BLD: 4.18 M/UL — LOW (ref 4.2–5.8)
RBC # FLD: 14.3 % — SIGNIFICANT CHANGE UP (ref 10.3–14.5)
SODIUM SERPL-SCNC: 138 MMOL/L — SIGNIFICANT CHANGE UP (ref 135–145)
WBC # BLD: 4.88 K/UL — SIGNIFICANT CHANGE UP (ref 3.8–10.5)
WBC # FLD AUTO: 4.88 K/UL — SIGNIFICANT CHANGE UP (ref 3.8–10.5)

## 2021-06-18 PROCEDURE — 99239 HOSP IP/OBS DSCHRG MGMT >30: CPT

## 2021-06-18 RX ORDER — WARFARIN SODIUM 2.5 MG/1
1 TABLET ORAL
Qty: 3 | Refills: 0
Start: 2021-06-18 | End: 2021-06-20

## 2021-06-18 RX ORDER — WARFARIN SODIUM 2.5 MG/1
0 TABLET ORAL
Qty: 0 | Refills: 0 | DISCHARGE

## 2021-06-18 RX ORDER — TAMSULOSIN HYDROCHLORIDE 0.4 MG/1
0 CAPSULE ORAL
Qty: 0 | Refills: 0 | DISCHARGE

## 2021-06-18 RX ORDER — TAMSULOSIN HYDROCHLORIDE 0.4 MG/1
1 CAPSULE ORAL
Qty: 0 | Refills: 0 | DISCHARGE
Start: 2021-06-18

## 2021-06-18 RX ADMIN — HEPARIN SODIUM 800 UNIT(S)/HR: 5000 INJECTION INTRAVENOUS; SUBCUTANEOUS at 09:11

## 2021-06-18 RX ADMIN — Medication 32.4 MILLIGRAM(S): at 06:06

## 2021-06-18 RX ADMIN — Medication 25 MICROGRAM(S): at 06:05

## 2021-06-18 RX ADMIN — Medication 100 MILLIGRAM(S): at 11:46

## 2021-06-18 RX ADMIN — Medication 32.4 MILLIGRAM(S): at 17:27

## 2021-06-18 RX ADMIN — Medication 100 MILLIGRAM(S): at 06:06

## 2021-06-18 NOTE — PROGRESS NOTE ADULT - PROBLEM SELECTOR PLAN 2
- C/w dilantin and phenobarbital home regimen.  - Seizure precautions.  Phenytoin level decreased, per discussion with his outpt neurologist , ok to cont aeds at current dose as he has not had any seizures on this current dose of aeds.
- C/w dilantin and phenobarbital home regimen.  - Seizure precautions.  - Phenytoin level decreased, per discussion with his outpt neurologist , ok to cont aeds at current dose as he has not had any seizures on this current dose of aeds.
- C/w dilantin and phenobarbital home regimen.  - Seizure precautions.  - Phenytoin level decreased, per discussion with his outpt neurologist , ok to cont aeds at current dose as he has not had any seizures on this current dose of aeds.
- C/w dilantin and phenobarbital home regimen.  - Seizure precautions.  Phenytoin level decreased, per discussion with his outpt neurologist , ok to cont aeds at current dose as he has not had any seizures on this current dose of aeds.
- C/w dilantin and phenobarbital home regimen.  - Seizure precautions.  - Phenytoin level decreased, per discussion with his outpt neurologist , ok to cont aeds at current dose as he has not had any seizures on this current dose of aeds.
- C/w dilantin and phenobarbital home regimen.  - Seizure precautions.  - Phenytoin level decreased, per discussion with his outpt neurologist , ok to cont aeds at current dose as he has not had any seizures on this current dose of aeds.
- C/w dilantin and phenobarbital home regimen.  - Seizure precautions.  Phenytoin level decreased, per discussion with his outpt neurologist , ok to cont aeds at current dose as he has not had any seizures on this current dose of aeds.
- C/w dilantin and phenobarbital home regimen.  - Seizure precautions.  Phenytoin level decreased, per discussion with his outpt neurologist , ok to cont aeds at current dose as he has not had any seizures on this current dose of aeds.
- C/w dilantin and phenobarbital home regimen.  - Seizure precautions.  Phenytoin level decreased, will rtp in am, will need neurologist info to obtain collateral

## 2021-06-18 NOTE — DISCHARGE NOTE NURSING/CASE MANAGEMENT/SOCIAL WORK - PATIENT PORTAL LINK FT
You can access the FollowMyHealth Patient Portal offered by [facility] by registering at the following website: [email or web address]. By joining [facility]’s FollowMyHealth portal, you will also be able to view your health information using other applications (apps) compatible with our system.

## 2021-06-18 NOTE — CONSULT NOTE ADULT - SUBJECTIVE AND OBJECTIVE BOX
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    He had no other symptoms.    He underwent two venous duplex ultrasounds at [facility] which did not demonstrate the presence of DVT in his left lower extremity.    My review of images from the outside study which was uploaded onto our PACS system also did not identify the clear presence of a DVT.  There was one still ultrasound image with color Doppler void within the mid left femoral vein which I believe was misinterpreted to be thrombi.  The mid left femoral vein was imaged twice in our vascular laboratory on 6/11/21 and 6/15/21 which did not show the presence of DVT.    With regards to candidacy for extended/long-term anticoagulation, this depends on his overall risk of VTE recurrence.  He carries a diagnosis of prior unprovoked VTE.       Allergies  No Known Allergies    MEDICATIONS:  heparin   Injectable 7500 Unit(s) IV Push every 6 hours PRN  heparin   Injectable 3500 Unit(s) IV Push every 6 hours PRN  heparin  Infusion.  Unit(s)/Hr IV Continuous <Continuous>  metoprolol tartrate 100 milliGRAM(s) Oral daily  tamsulosin 0.4 milliGRAM(s) Oral at bedtime  PHENobarbital 32.4 milliGRAM(s) Oral two times a day  phenytoin   Capsule 200 milliGRAM(s) Oral at bedtime  phenytoin   Capsule 100 milliGRAM(s) Oral daily  levothyroxine 25 MICROGram(s) Oral daily  simvastatin 20 milliGRAM(s) Oral at bedtime    PAST MEDICAL & SURGICAL HISTORY:  Hyperlipemia  Seizure Disorder  Dupuytren&#x27;s Contracture of Hand  History of Tonsillectomy  Benign Tumor of Short Bones of Lower Limb  removed  Hand(s) Dupuytrens Contracture  repair x 3 bilaterally  S/P Colonoscopic Polypectomy  ? year.    FAMILY HISTORY:  No pertinent family history in first degree relatives    SOCIAL HISTORY:    As above, as per chart notes    REVIEW OF SYSTEMS:  As above, as per chart notes    PHYSICAL EXAM:  T(C): 36.3 (06-18-21 @ 05:56), Max: 36.9 (06-17-21 @ 21:45)  HR: 68 (06-18-21 @ 05:56) (61 - 72)  BP: 139/- (06-18-21 @ 05:56) (117/62 - 139/-)  RR: 16 (06-18-21 @ 05:56) (16 - 18)  SpO2: 97% (06-18-21 @ 05:56) (95% - 97%)  Wt(kg): --  I&O's Summary    17 Jun 2021 07:01  -  18 Jun 2021 07:00  --------------------------------------------------------  IN: 788 mL / OUT: 2000 mL / NET: -1212 mL        Appearance: ND  HEENT:   No JVD  Cardiovascular: Normal S1 S2, No JVD, No murmurs, No edema  Respiratory: Lungs clear to auscultation	  Psychiatry: Awake, alert  Extremities:     LABS:	 	                          13.3   4.72  )-----------( 183      ( 17 Jun 2021 06:53 )             40.0       06-17    136  |  101  |  21  ----------------------------<  85  4.4   |  24  |  0.84    Ca    9.1      17 Jun 2021 06:53  Phos  3.3     06-17  Mg     2.3     06-17      < from: [facility] Duplex Ext Veins Lower Comp, Left. (06.15.21 @ 16:33) >    Patient name: CHARLIE BILLY  Date of test: 6/15/2021  MR#: [id number]  [facility] #: [id number]    Location: [facility] Physician(s): , Erlinda Holden MD  Interpreted by: Bharat Cosby MD, [facility], MATILDA, EVA  Tech: Gianfranco Hernandez PATRIA  Type of Test: Lower Extremity Venous  ------------------------------------------------------------------------  Procedure: Real-time grayscale and color Duplex  ultrasonography was used to interrogate the deep veins of  the left lower extremities.  Indications: Localized swelling, mass and lump, lower  limb, bilateral (R22.43)  ------------------------------------------------------------------------  RESULT:  ------------------------------------------------------------------------  LEFT:  Deep venous thrombosis: No  Superficial venous thrombosis: (Not Examined)  Deep venous insufficiency: No  Superficial venous insufficiency: (Not Examined)  ------------------------------------------------------------------------  Left Findings: No evidence of deep venous thrombosis in  the left lower extremity.  The left common femoral,  femoral, popliteal, and gastrocnemius veins appear  sonographically normal and compressible, without evidence  of thrombosis.  Normal phasic Doppler waveforms noted in the left common  femoralvein.  ------------------------------------------------------------------------  Summary/Impressions:  No evidence of deep vein thrombosis in the left lower  extremity.  No interval change since the prior study from 06/11/21.  ------------------------------------------------------------------------  Confirmed on  6/15/2021 - 6:25 PM by Bharat Cosby MD, CLAUDINE,  MATILDA, KELLEY  By signing this report, the attending physician certifies  that he or she has personally supervised and interpreted  the vascular study and has reviewed and or edited and  agrees with the written comments contained within the  report.    < end of copied text >  < from: VA Duplex Ext Veins Lower Comp, Bilat. (06.11.21 @ 14:34) >    Patient name: CHARLIE BILLY  Date of test: 6/11/2021  MR#: [id number]  [facility] #: [id number]    Location: [facility] Physician(s): , Lenore Vasquez MD  Interpreted by: Bharat Cosby MD, CLAUDINE, KELLEY GREY  Tech: Gianfranco Hernandez [facility]  Type of Test: LowerExtremity Venous  ------------------------------------------------------------------------  Procedure: Real-time grayscale and color Duplex  ultrasonography was used to interrogate the deep veins of  the bilateral lower extremities.  Indications: Localized swelling, mass and lump, lower  limb, bilateral (R22.43)  ------------------------------------------------------------------------  RESULT:  ------------------------------------------------------------------------  RIGHT:  Deep venous thrombosis: No  Superficial venous thrombosis: No  Deep venous insufficiency: No  Superficial venous insufficiency: No  ------------------------------------------------------------------------  LEFT:  Deep venous thrombosis: No  Superficial venous thrombosis: No  Deep venous insufficiency: No  Superficial venous insufficiency: No  ------------------------------------------------------------------------  Right Findings: No evidence of thrombosis in the right  lower extremity.  The right common femoral, femoral,  popliteal, gastrocnemius, posterior tibial, and peroneal  veins appear sonographically normal and compressible,  without evidence of thrombosis.  Normal phasic Doppler waveforms noted in the right common  femoral vein.  No evidence of superficial vein thrombosis.  The right  great saphenous and short saphenous veins are patent, and  demonstrate full compressibility.  No evidence of deep and superficial venous insufficiency  in the right lower extremity.  Normal valve closure times  (VCT) with proximal and distal augmentation noted within  the common femoral, femoral, popliteal, great saphenous,  and short saphenous veins.  Left Findings: No evidence of thrombosis in the left lower  extremity.  The left common femoral, femoral, popliteal,  gastrocnemius, posterior tibial, and peroneal veins appear  sonographically normal and compressible, without evidence  of thrombosis.  Normal phasic Doppler waveforms noted in the left common  femoral vein.  No evidence of superficial vein thrombosis.  The left  great saphenous and short saphenous veins are patent, and  demonstrate full compressibility.  No evidence of deep and superficial venous insufficiency  in the left lower extremity.  Normal valve closure times  (VCT) with proximal and distal augmentation noted within  the common femoral, femoral, popliteal, great saphenous,  and short saphenous veins.  ------------------------------------------------------------------------  Summary/Impressions:  1.  No evidence of deep vein thrombosis in the right and  left lower extremities.  2.  No evidence of deep and superficial venous  insufficiency noted in the right and left lower  extremities.  ------------------------------------------------------------------------  Confirmed on  6/11/2021 - 6:43 PM by Bharat Cosby MD, [facility],  Replaced by [facility], VI  By signing this report, the attending physician certifies  that he or she has personally supervised and interpreted  the vascular study and has reviewed and or edited and  agrees with the written comments contained within the  report.    < end of copied text >

## 2021-06-18 NOTE — PROGRESS NOTE ADULT - PROBLEM SELECTOR PLAN 4
- C/w BB for bp control.  - BP currently at goal.

## 2021-06-18 NOTE — PROGRESS NOTE ADULT - PROBLEM SELECTOR PLAN 1
- Patient subtherapeutic despite taking prescribed dose coumadin. This is expected as patient is concurrently taking dilantin and phenobarbital.   - anticardiolipin ab and IgG and IgM-neg; protein c/s low will need to be repeat as outpt   - CT C/A/P neg for malignancy  - fobt neg  - LE doppler without DVT X 2   - Team d/w cardiology in regards to DOAC not candidate due to interaction with antiepileptic meds. Pt unwilling to inject with lovenox   - INR remains subtherapeutic at this time; cont to monitor INR daily; educated on diet when on coumadin   - INR 1.37--1.44--1.69  - heparin bridge to coumadin goal INR 2-3. 5--5--7.5-- 7.5 mg ---10mg---10mg   - outpt US without DVT and in house DVT also without clot. d/w outpt PCP Dr. barroso and cardiologist hx of prior unprovoked DVT lifelong AC as per PCP and does not need a bridge.  -D/w PCP will discharge on coumadin 10 mg with repeat INR tomm

## 2021-06-18 NOTE — DISCHARGE NOTE PROVIDER - PROVIDER TOKENS
PROVIDER:[TOKEN:[[id number]],FOLLOWUP:[1 week]],FREE:[LAST:[Primary care provider],PHONE:[(   )    -],FAX:[(   )    -],ADDRESS:[[phone]],SCHEDULEDAPPT:[06/19/2021]]

## 2021-06-18 NOTE — PROGRESS NOTE ADULT - NSICDXPILOT_GEN_ALL_CORE
[place]

## 2021-06-18 NOTE — PROGRESS NOTE ADULT - PROBLEM SELECTOR PROBLEM 1
Acute deep vein thrombosis (DVT) of femoral vein of left lower extremity
DVT (deep venous thrombosis)
Acute deep vein thrombosis (DVT) of femoral vein of left lower extremity
DVT (deep venous thrombosis)

## 2021-06-18 NOTE — PROGRESS NOTE ADULT - PROBLEM SELECTOR PLAN 5
- on coumadin   - Precautions ordered.  - Plan discussed with ACP    Transitions of Care Status:  1.  Name of PCP: kraig Browning [phone]  2.  PCP Contacted on Admission: [ ] Y    [ ] N    3.  PCP contacted at Discharge: [ x] Y    [ ] N    [ ] N/A  4.  Post-Discharge Appointment Date and Location: to follow up with PCP tomm for INR  5.  Summary of Handoff given to PCP: Yes    discharge 40 min

## 2021-06-18 NOTE — DISCHARGE NOTE PROVIDER - NSDCMRMEDTOKEN_GEN_ALL_CORE_FT
LEVOTHYROXINE SODIUM  25 MCG TABS:   METOPROLOL TARTRATE 100 MG TAB: TAKE 1 TABLET BY MOUTH EVERY DAY  PHENOBARBITAL 32.4 MG TABLET: TAKE 1 TABLET BY MOUTH TWICE A DAY  PHENYTOIN SOD  MG CAP: TAKE 3 CAPSULES BY MOUTH EVERY DAY AT BEDTIME  SIMVASTATIN 20 MG TABLET: TAKE 1 TABLET BY MOUTH EVERY DAY  TAMSULOSIN HYDROCHLORIDE  0.4 MG CAPS:   WARFARIN SODIUM 5 MG TABLET: AS DIRECTED   LEVOTHYROXINE SODIUM  25 MCG TABS:   METOPROLOL TARTRATE 100 MG TAB: TAKE 1 TABLET BY MOUTH EVERY DAY  PHENOBARBITAL 32.4 MG TABLET: TAKE 1 TABLET BY MOUTH TWICE A DAY  PHENYTOIN SOD  MG CAP: TAKE 3 CAPSULES BY MOUTH EVERY DAY AT BEDTIME  SIMVASTATIN 20 MG TABLET: TAKE 1 TABLET BY MOUTH EVERY DAY  tamsulosin 0.4 mg oral capsule: 1 cap(s) orally once a day (at bedtime)  warfarin 10 mg oral tablet: 1 tab(s) orally once a day, start 6/18 pm

## 2021-06-18 NOTE — PROGRESS NOTE ADULT - PROBLEM SELECTOR PROBLEM 2
Seizure Disorder

## 2021-06-18 NOTE — DISCHARGE NOTE PROVIDER - NSDCCPCAREPLAN_GEN_ALL_CORE_FT
PRINCIPAL DISCHARGE DIAGNOSIS  Diagnosis: DVT (deep venous thrombosis)  Assessment and Plan of Treatment: You are being discharged on coumadin 10mg x 3 days. Monitor INR level. Follow up with your PCP for INR check tomorrow, 6/19. Goal INR 2-3. Your INR on discharge day was 1.69. Please call your medical provider right away or return to hospital if you experience any abnormal bleeding, swelling, chest pain, or shortness of breath. Outpatient follow up with your primary care provider and cardiologist within 1 week of discharge from hospital- it is important you follow up to determine if coumadin dose needs to be adjusted.      SECONDARY DISCHARGE DIAGNOSES  Diagnosis: Seizure Disorder  Assessment and Plan of Treatment: Continue seizure medications as directed. Outpatient follow up with your neurologist as directed.    Diagnosis: Mitral regurgitation  Assessment and Plan of Treatment: Outpatient follow up with your cardiologist    Diagnosis: Essential hypertension  Assessment and Plan of Treatment: Continue current blood pressure medication regimen as directed. Monitor for any visual changes, headaches or dizziness.  Monitor blood pressure regularly.  Follow up with your PCP for further management for high blood pressure, please call to make appointment within 1 week of discharge

## 2021-06-18 NOTE — DISCHARGE NOTE PROVIDER - HOSPITAL COURSE
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     DVT (deep venous thrombosis)  - Patient subtherapeutic despite taking prescribed dose coumadin. This is expected as patient is concurrently taking dilantin and phenobarbital.   - anticardiolipin ab and IgG and IgM-neg; protein c/s low will need to be repeat as outpt   - CT C/A/P neg for malignancy  - fobt neg  - LE doppler without DVT X 2   - Team d/w cardiology in regards to DOAC not candidate due to interaction with antiepileptic meds. Pt unwilling to inject with lovenox   - INR remains subtherapeutic at this time; cont to monitor INR daily; educated on diet when on coumadin   - INR 1.37--1.44--1.69  - heparin bridge to coumadin goal INR 2-3. 5--5--7.5-- 7.5 mg ---10mg---10mg   - outpt US without DVT and in house DVT also without clot. d/w outpt PCP Dr. barroso and cardiologist hx of prior unprovoked DVT lifelong AC as per PCP and does not need a bridge.  -D/w PCP will discharge on coumadin 10 mg with repeat INR tomm.     Seizure Disorder   - C/w dilantin and phenobarbital home regimen.  - Seizure precautions.  - Phenytoin level decreased, per discussion with his outpt neurologist , ok to cont aeds at current dose as he has not had any seizures on this current dose of aeds.      Problem/Plan - 3:  ·  Problem: Mitral regurgitation.  Plan: - S/p repair in 2011.  - No signs of acute decompensated heart failure at this time.  - Closely monitor I&Os.   - Close cardiology f/u.  - echo pending- can be done as outpt.      Problem/Plan - 4:  ·  Problem: Essential hypertension.  Plan: - C/w BB for bp control.  - BP currently at goal.      Problem/Plan - 5:  ·  Problem: Need for prophylactic measure.  Plan: - on coumadin   - Precautions ordered.  - Plan discussed with ACP    Transitions of Care Status:  1.  Name of PCP: kraig Browning [phone]    On_________, discussed with __________, patient is medically cleared and optimized for discharge today. All medications were reviewed with attending, and sent to mutually agreed upon pharmacy. 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     DVT (deep venous thrombosis)  - Patient subtherapeutic despite taking prescribed dose coumadin. This is expected as patient is concurrently taking dilantin and phenobarbital.   - anticardiolipin ab and IgG and IgM-neg; protein c/s low will need to be repeat as outpt   - CT C/A/P neg for malignancy  - fobt neg  - LE doppler without DVT X 2   - Team d/w cardiology in regards to DOAC not candidate due to interaction with antiepileptic meds. Pt unwilling to inject with lovenox   - Educated on diet when on coumadin   - Trended INR 1.37--1.44--1.69  - heparin bridge to coumadin goal INR 2-3. 5--5--7.5-- 7.5 mg ---10mg---10mg   - outpt US without DVT and in house DVT also without clot. d/w outpt PCP Dr. barroso and cardiologist hx of prior unprovoked DVT lifelong AC as per PCP and does not need a bridge.  - D/w PCP will discharge on coumadin 10 mg with repeat INR tomorrow     Seizure Disorder   - C/w dilantin and phenobarbital home regimen.  - Seizure precautions.  - Phenytoin level decreased, per discussion with his outpt neurologist , ok to cont aeds at current dose as he has not had any seizures on this current dose of aeds.     Mitral regurgitation  - S/p repair in 2011.  - No signs of acute decompensated heart failure at this time.  - Close cardiology f/u.  - echo to be done as outpatient     Essential hypertension  - C/w BB for bp control.  - BP currently at goal.       On 6/18/2021, discussed with Dr. Holden, patient is medically cleared and optimized for discharge today. All medications were reviewed with attending, and sent to mutually agreed upon pharmacy. Pt to see Dr. Browning [phone] for INR check tomorrow. Pt to follow up with reynaldo Mason next week.

## 2021-06-18 NOTE — PROGRESS NOTE ADULT - SUBJECTIVE AND OBJECTIVE BOX
Cardiovascular Disease Progress Note    Overnight events: No acute events overnight.  no new cardiac sx  Otherwise review of systems negative    Objective Findings:  T(C): 36.3 (06-18-21 @ 05:56), Max: 36.9 (06-17-21 @ 21:45)  HR: 68 (06-18-21 @ 05:56) (61 - 72)  BP: 139/- (06-18-21 @ 05:56) (117/62 - 139/-)  RR: 16 (06-18-21 @ 05:56) (16 - 18)  SpO2: 97% (06-18-21 @ 05:56) (95% - 97%)  Wt(kg): --  Daily     Daily       Physical Exam:  Gen: NAD  HEENT: EOMI  CV: RRR, normal S1 + S2, no m/r/g  Lungs: CTAB  Abd: soft, non-tender  Ext: No edema    Telemetry:    Laboratory Data:                        13.3   4.72  )-----------( 183      ( 17 Jun 2021 06:53 )             40.0     06-17    136  |  101  |  21  ----------------------------<  85  4.4   |  24  |  0.84    Ca    9.1      17 Jun 2021 06:53  Phos  3.3     06-17  Mg     2.3     06-17      PT/INR - ( 17 Jun 2021 06:53 )   PT: 16.3 sec;   INR: 1.44 ratio         PTT - ( 17 Jun 2021 06:53 )  PTT:64.5 sec          Inpatient Medications:  MEDICATIONS  (STANDING):  heparin  Infusion.  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 ct chest/abd/pelvis without acute findings, fobt neg,  psa  therapeutic ac with warfarin, goal inr 2-3. patient refusing lovenox bridge  le david duplex neg for dvt, doubt failure of coumadin, c/w ac for now  dispo planning once inr therapeutic      Over 25 minutes spent on total encounter; more than 50% of the visit was spent counseling and/or coordinating care by the attending physician.      Carlo Mason MD   Cardiovascular Disease  [phone]

## 2021-06-18 NOTE — PROGRESS NOTE ADULT - SUBJECTIVE AND OBJECTIVE BOX
Patient is a 71y old  Male who presents with a chief complaint of DVT (18 Jun 2021 07:27)      SUBJECTIVE / OVERNIGHT EVENTS: Pt in NAD no acute events ON   ADDITIONAL REVIEW OF SYSTEMS: denies CP/SOB    MEDICATIONS  (STANDING):  heparin  Infusion.  Unit(s)/Hr (17 mL/Hr) IV Continuous <Continuous>  levothyroxine 25 MICROGram(s) Oral daily  metoprolol tartrate 100 milliGRAM(s) Oral daily  PHENobarbital 32.4 milliGRAM(s) Oral two times a day  phenytoin   Capsule 200 milliGRAM(s) Oral at bedtime  phenytoin   Capsule 100 milliGRAM(s) Oral daily  simvastatin 20 milliGRAM(s) Oral at bedtime  tamsulosin 0.4 milliGRAM(s) Oral at bedtime    MEDICATIONS  (PRN):  heparin   Injectable 7500 Unit(s) IV Push every 6 hours PRN For aPTT less than 40  heparin   Injectable 3500 Unit(s) IV Push every 6 hours PRN For aPTT between 40 - 57      CAPILLARY BLOOD GLUCOSE        I&O's Summary    17 Jun 2021 07:01  -  18 Jun 2021 07:00  --------------------------------------------------------  IN: 788 mL / OUT: 2000 mL / NET: -1212 mL    18 Jun 2021 07:01  -  18 Jun 2021 12:15  --------------------------------------------------------  IN: 0 mL / OUT: 300 mL / NET: -300 mL        PHYSICAL EXAM:  Vital Signs Last 24 Hrs  T(C): 36.3 (18 Jun 2021 05:56), Max: 36.9 (17 Jun 2021 21:45)  T(F): 97.4 (18 Jun 2021 05:56), Max: 98.4 (17 Jun 2021 21:45)  HR: 68 (18 Jun 2021 05:56) (61 - 72)  BP: 139/- (18 Jun 2021 05:56) (117/62 - 139/-)  BP(mean): --  RR: 16 (18 Jun 2021 05:56) (16 - 18)  SpO2: 97% (18 Jun 2021 05:56) (95% - 97%)  CONSTITUTIONAL: NAD, well-developed, well-groomed  EYES: PERRLA; conjunctiva and sclera clear  ENMT: Moist oral mucosa, no pharyngeal injection or exudates; normal dentition  NECK: Supple, no palpable masses; no thyromegaly  RESPIRATORY: Normal respiratory effort; lungs are clear to auscultation bilaterally  CARDIOVASCULAR: Regular rate and rhythm, normal S1 and S2, no murmur/rub/gallop; No lower extremity edema; Peripheral pulses are 2+ bilaterally  ABDOMEN: Nontender to palpation, normoactive bowel sounds, no rebound/guarding; No hepatosplenomegaly  MUSCULOSKELETAL:  Normal gait; no clubbing or cyanosis of digits; no joint swelling or tenderness to palpation  PSYCH: A+O to person, place, and time; affect appropriate  NEUROLOGY: CN 2-12 are intact and symmetric; no gross sensory deficits   SKIN: No rashes; no palpable lesions    LABS:                        13.2   4.88  )-----------( 185      ( 18 Jun 2021 08:06 )             39.8     06-18    138  |  103  |  24<H>  ----------------------------<  88  4.1   |  23  |  0.87    Ca    8.7      18 Jun 2021 08:06  Phos  3.5     06-18  Mg     2.3     06-18      PT/INR - ( 18 Jun 2021 08:06 )   PT: 18.8 sec;   INR: 1.69 ratio         PTT - ( 18 Jun 2021 08:06 )  PTT:70.5 sec            RADIOLOGY & ADDITIONAL TESTS:  Results Reviewed:   Imaging Personally Reviewed:  Electrocardiogram Personally Reviewed:    COORDINATION OF CARE:  Care Discussed with Consultants/Other Providers [Y/N]:  Prior or Outpatient Records Reviewed [Y/N]:

## 2021-06-18 NOTE — PROGRESS NOTE ADULT - PROVIDER SPECIALTY LIST ADULT
Cardiology
Hospitalist
Vascular Surgery
Cardiology
Hospitalist
Vascular Surgery
Hospitalist
Hospitalist
Vascular Surgery
Hospitalist

## 2021-06-18 NOTE — PROGRESS NOTE ADULT - PROBLEM SELECTOR PLAN 3
- S/p repair in 2011.  - No signs of acute decompensated heart failure at this time.  - Closely monitor I&Os.   - Close cardiology f/u.  - echo pending- can be done as outpt

## 2021-06-18 NOTE — DISCHARGE NOTE PROVIDER - CARE PROVIDER_API CALL
Carlo Mason)  Internal Medicine  [id number] [address]  Phone: [phone]  Fax: [phone]  Follow Up Time: 1 week    Primary care provider,   kraig Browning [phone]  Phone: (   )    -  Fax: (   )    -  Scheduled Appointment: 06/19/2021

## 2022-11-18 ENCOUNTER — APPOINTMENT (OUTPATIENT)
Dept: ELECTROPHYSIOLOGY | Facility: CLINIC | Age: 72
End: 2022-11-18

## 2022-11-18 ENCOUNTER — NON-APPOINTMENT (OUTPATIENT)
Age: 72
End: 2022-11-18

## 2022-11-18 VITALS
SYSTOLIC BLOOD PRESSURE: 146 MMHG | HEART RATE: 70 BPM | DIASTOLIC BLOOD PRESSURE: 76 MMHG | HEIGHT: 72 IN | BODY MASS INDEX: 27.36 KG/M2 | WEIGHT: 202 LBS | OXYGEN SATURATION: 97 %

## 2022-11-18 DIAGNOSIS — R56.9 UNSPECIFIED CONVULSIONS: ICD-10-CM

## 2022-11-18 DIAGNOSIS — Z78.9 OTHER SPECIFIED HEALTH STATUS: ICD-10-CM

## 2022-11-18 DIAGNOSIS — G40.909 EPILEPSY, UNSPECIFIED, NOT INTRACTABLE, W/OUT STATUS EPILEPTICUS: ICD-10-CM

## 2022-11-18 DIAGNOSIS — Z86.79 PERSONAL HISTORY OF OTHER DISEASES OF THE CIRCULATORY SYSTEM: ICD-10-CM

## 2022-11-18 DIAGNOSIS — I82.409 ACUTE EMBOLISM AND THROMBOSIS OF UNSPECIFIED DEEP VEINS OF UNSPECIFIED LOWER EXTREMITY: ICD-10-CM

## 2022-11-18 DIAGNOSIS — I10 ESSENTIAL (PRIMARY) HYPERTENSION: ICD-10-CM

## 2022-11-18 DIAGNOSIS — Z79.01 LONG TERM (CURRENT) USE OF ANTICOAGULANTS: ICD-10-CM

## 2022-11-18 DIAGNOSIS — E78.5 HYPERLIPIDEMIA, UNSPECIFIED: ICD-10-CM

## 2022-11-18 PROCEDURE — 93000 ELECTROCARDIOGRAM COMPLETE: CPT

## 2022-11-18 PROCEDURE — 99213 OFFICE O/P EST LOW 20 MIN: CPT | Mod: 25

## 2022-11-18 RX ORDER — PHENOBARBITAL 32.4 MG/1
32.4 TABLET ORAL
Qty: 60 | Refills: 0 | Status: ACTIVE | COMMUNITY
Start: 2022-08-04

## 2022-11-18 RX ORDER — SIMVASTATIN 20 MG/1
20 TABLET, FILM COATED ORAL
Qty: 90 | Refills: 0 | Status: ACTIVE | COMMUNITY
Start: 2022-08-25

## 2022-11-18 RX ORDER — PHENYTOIN SODIUM 100 MG/1
100 CAPSULE ORAL
Qty: 270 | Refills: 0 | Status: ACTIVE | COMMUNITY
Start: 2022-10-10

## 2022-11-18 RX ORDER — METOPROLOL TARTRATE 100 MG/1
100 TABLET, FILM COATED ORAL
Qty: 60 | Refills: 0 | Status: ACTIVE | COMMUNITY
Start: 2022-05-16

## 2022-11-18 RX ORDER — LEVOTHYROXINE SODIUM 0.03 MG/1
25 TABLET ORAL
Qty: 90 | Refills: 0 | Status: ACTIVE | COMMUNITY
Start: 2022-10-31

## 2022-11-18 RX ORDER — WARFARIN 5 MG/1
5 TABLET ORAL
Qty: 90 | Refills: 0 | Status: ACTIVE | COMMUNITY
Start: 2022-10-14

## 2022-11-18 NOTE — HISTORY OF PRESENT ILLNESS
[FreeTextEntry1] : Derik Almeida is a 71y/o man with Hx of HTN, HLD, hypothyroid, MR s/p annuloplasty ring repair in 2011, PAF, unprovoked VTE (on lifelong Coumadin as per notes in 2021), epilepsy, and DVT (6/2021) who presents today for initial evaluation. Admits doing well. Denies chest pain, palpitations, SOB, syncope or near syncope. On review of hospital records, PCP stated pt to be on Coumadin for life given Hx of recurrent DVTs. Pt is unsure. Still on warfarin. INRs have been subtherapeutic (has had DVT in the past when this occurred- 6/2021). Was also on Amiodarone at some point but he is unsure when or why. According to Dr. Mason, patient has a history of paroxysmal atrial fibrillation.

## 2022-11-18 NOTE — DISCUSSION/SUMMARY
[EKG obtained to assist in diagnosis and management of assessed problem(s)] : EKG obtained to assist in diagnosis and management of assessed problem(s) [FreeTextEntry1] : Impression:\par \par 1. On AC: Currently on long term use of Coumadin given Hx of DVTs and recurrent DVTs when subtherapeutic on warfarin. EKG performed today to assess for presence of conduction disease and reveals NSR with first degree AV delay. Was on Amiodarone at some point but unsure why or when. Dr. Mason informed me that the patient has a history of AF. Given AF, RSYVM1mopr of 2, h/o DVT, recommend anticoagulation. \par \par 2. HTN: resume oral antihypertensives as prescribed. Encouraged heart healthy diet, sodium restriction, and weight loss. Continue regular f/u with Cardiologist for further HTN management.\par \par 3. HLD: resume statin therapy as prescribed and regular f/u with Cardiologist for routine lipid monitoring and management.\par

## 2022-12-28 NOTE — CONSULT NOTE ADULT - CONSULT REQUESTED BY NAME
Dr. Beau Adames
Dr Adames
A1c 6.5. reports episodes of hypoglycemia/skipping meals. insulin dependent.

## 2023-08-09 NOTE — ED PROVIDER NOTE - CONSTITUTIONAL NEGATIVE STATEMENT, MLM
[de-identified] : I went over the pathophysiology of the patient's symptoms in great detail with the patient. The patient elected to receive a Synvisc injection into his left knee today and tolerated it well. I instructed the patient on ROM exercises and told them to take it easy. The use of ice and rest was reviewed with the patient. The patient may resume activities tomorrow. I reminded the patient that it takes 4 to 6 weeks after the final injection to feel symptom relief.   All of their questions were answered. They understand and consent to the plan.  FU in 6 weeks. After finishing a series of Euflexxa 3/3 today 08/09/2023. no fever and no chills.

## 2023-09-21 NOTE — H&P ADULT - PROBLEM/PLAN-3
[address] [facility] coding opportunities       Chart reviewed, no opportunity found: CHART REVIEWED, NO OPPORTUNITY FOUND        Patients Insurance     Medicare Insurance: [employer] DISPLAY PLAN FREE TEXT [id number]

## 2025-02-22 NOTE — ED ADULT NURSE NOTE - NS ED NURSE RECORD ANOTHER VITAL SIGN
Pt c/o cough, congestion, sore throat, HA, and fatigue for past few days. Also reports intermittently feeling sick in past few weeks. Pt states roommate was sick with something recently. Pt alert.     Triage Assessment (Adult)       Row Name 02/22/25 1218          Triage Assessment    Airway WDL WDL        Respiratory WDL    Respiratory WDL X;rhythm/pattern;cough     Rhythm/Pattern, Respiratory dyspnea upon exertion;shortness of breath;tachypneic     Cough Frequency infrequent     Cough Type congested;productive  productive of thick and dark mucous        Skin Circulation/Temperature WDL    Skin Circulation/Temperature WDL WDL        Cardiac WDL    Cardiac WDL WDL        Peripheral/Neurovascular WDL    Peripheral Neurovascular WDL WDL        Cognitive/Neuro/Behavioral WDL    Cognitive/Neuro/Behavioral WDL WDL                      Yes